# Patient Record
Sex: FEMALE | Race: AMERICAN INDIAN OR ALASKA NATIVE | URBAN - METROPOLITAN AREA
[De-identification: names, ages, dates, MRNs, and addresses within clinical notes are randomized per-mention and may not be internally consistent; named-entity substitution may affect disease eponyms.]

---

## 2017-07-28 ENCOUNTER — INPATIENT (INPATIENT)
Facility: HOSPITAL | Age: 82
LOS: 0 days | Discharge: HOME | End: 2017-07-29
Attending: INTERNAL MEDICINE

## 2017-08-02 DIAGNOSIS — R10.9 UNSPECIFIED ABDOMINAL PAIN: ICD-10-CM

## 2017-08-02 DIAGNOSIS — T83.511A INFECTION AND INFLAMMATORY REACTION DUE TO INDWELLING URETHRAL CATHETER, INITIAL ENCOUNTER: ICD-10-CM

## 2017-08-02 DIAGNOSIS — R53.1 WEAKNESS: ICD-10-CM
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
## 2017-08-02 DIAGNOSIS — R42 DIZZINESS AND GIDDINESS: ICD-10-CM

## 2017-08-02 DIAGNOSIS — I10 ESSENTIAL (PRIMARY) HYPERTENSION: ICD-10-CM

## 2018-05-06 ENCOUNTER — INPATIENT (INPATIENT)
Facility: HOSPITAL | Age: 83
LOS: 4 days | Discharge: HOME | End: 2018-05-11
Attending: INTERNAL MEDICINE | Admitting: INTERNAL MEDICINE

## 2018-05-06 VITALS
HEART RATE: 60 BPM | TEMPERATURE: 96 F | OXYGEN SATURATION: 98 % | DIASTOLIC BLOOD PRESSURE: 92 MMHG | SYSTOLIC BLOOD PRESSURE: 144 MMHG | RESPIRATION RATE: 18 BRPM

## 2018-05-06 LAB
ANION GAP SERPL CALC-SCNC: 20 MMOL/L — HIGH (ref 7–14)
BASOPHILS # BLD AUTO: 0.02 K/UL — SIGNIFICANT CHANGE UP (ref 0–0.2)
BASOPHILS NFR BLD AUTO: 0.3 % — SIGNIFICANT CHANGE UP (ref 0–1)
BUN SERPL-MCNC: 17 MG/DL — SIGNIFICANT CHANGE UP (ref 10–20)
CALCIUM SERPL-MCNC: 8.9 MG/DL — SIGNIFICANT CHANGE UP (ref 8.5–10.1)
CHLORIDE SERPL-SCNC: 101 MMOL/L — SIGNIFICANT CHANGE UP (ref 98–110)
CHOLEST SERPL-MCNC: 158 MG/DL — SIGNIFICANT CHANGE UP (ref 100–200)
CK MB CFR SERPL CALC: 2.6 NG/ML — SIGNIFICANT CHANGE UP (ref 0.6–6.3)
CK SERPL-CCNC: 73 U/L — SIGNIFICANT CHANGE UP (ref 0–225)
CO2 SERPL-SCNC: 19 MMOL/L — SIGNIFICANT CHANGE UP (ref 17–32)
CREAT SERPL-MCNC: 0.9 MG/DL — SIGNIFICANT CHANGE UP (ref 0.7–1.5)
EOSINOPHIL # BLD AUTO: 0.09 K/UL — SIGNIFICANT CHANGE UP (ref 0–0.7)
EOSINOPHIL NFR BLD AUTO: 1.6 % — SIGNIFICANT CHANGE UP (ref 0–8)
ESTIMATED AVERAGE GLUCOSE: 123 MG/DL — HIGH (ref 68–114)
GLUCOSE SERPL-MCNC: 130 MG/DL — HIGH (ref 70–99)
HBA1C BLD-MCNC: 5.9 % — HIGH (ref 4–5.6)
HCT VFR BLD CALC: 41.3 % — SIGNIFICANT CHANGE UP (ref 37–47)
HDLC SERPL-MCNC: 49 MG/DL — SIGNIFICANT CHANGE UP (ref 40–125)
HGB BLD-MCNC: 14.1 G/DL — SIGNIFICANT CHANGE UP (ref 12–16)
IMM GRANULOCYTES NFR BLD AUTO: 0.3 % — SIGNIFICANT CHANGE UP (ref 0.1–0.3)
LIPID PNL WITH DIRECT LDL SERPL: 108 MG/DL — SIGNIFICANT CHANGE UP (ref 4–129)
LYMPHOCYTES # BLD AUTO: 1.59 K/UL — SIGNIFICANT CHANGE UP (ref 1.2–3.4)
LYMPHOCYTES # BLD AUTO: 27.6 % — SIGNIFICANT CHANGE UP (ref 20.5–51.1)
MAGNESIUM SERPL-MCNC: 2.2 MG/DL — SIGNIFICANT CHANGE UP (ref 1.8–2.4)
MCHC RBC-ENTMCNC: 27.5 PG — SIGNIFICANT CHANGE UP (ref 27–31)
MCHC RBC-ENTMCNC: 34.1 G/DL — SIGNIFICANT CHANGE UP (ref 32–37)
MCV RBC AUTO: 80.5 FL — LOW (ref 81–99)
MONOCYTES # BLD AUTO: 0.71 K/UL — HIGH (ref 0.1–0.6)
MONOCYTES NFR BLD AUTO: 12.3 % — HIGH (ref 1.7–9.3)
NEUTROPHILS # BLD AUTO: 3.33 K/UL — SIGNIFICANT CHANGE UP (ref 1.4–6.5)
NEUTROPHILS NFR BLD AUTO: 57.9 % — SIGNIFICANT CHANGE UP (ref 42.2–75.2)
PLATELET # BLD AUTO: 248 K/UL — SIGNIFICANT CHANGE UP (ref 130–400)
POTASSIUM SERPL-MCNC: 4.2 MMOL/L — SIGNIFICANT CHANGE UP (ref 3.5–5)
POTASSIUM SERPL-SCNC: 4.2 MMOL/L — SIGNIFICANT CHANGE UP (ref 3.5–5)
RBC # BLD: 5.13 M/UL — SIGNIFICANT CHANGE UP (ref 4.2–5.4)
RBC # FLD: 13.2 % — SIGNIFICANT CHANGE UP (ref 11.5–14.5)
SODIUM SERPL-SCNC: 140 MMOL/L — SIGNIFICANT CHANGE UP (ref 135–146)
TOTAL CHOLESTEROL/HDL RATIO MEASUREMENT: 3.2 RATIO — LOW (ref 4–5.5)
TRIGL SERPL-MCNC: 84 MG/DL — SIGNIFICANT CHANGE UP (ref 10–149)
TROPONIN T SERPL-MCNC: <0.01 NG/ML — SIGNIFICANT CHANGE UP
TROPONIN T SERPL-MCNC: <0.01 NG/ML — SIGNIFICANT CHANGE UP
WBC # BLD: 5.76 K/UL — SIGNIFICANT CHANGE UP (ref 4.8–10.8)
WBC # FLD AUTO: 5.76 K/UL — SIGNIFICANT CHANGE UP (ref 4.8–10.8)

## 2018-05-06 RX ORDER — SODIUM CHLORIDE 9 MG/ML
1000 INJECTION, SOLUTION INTRAVENOUS ONCE
Qty: 0 | Refills: 0 | Status: COMPLETED | OUTPATIENT
Start: 2018-05-06 | End: 2018-05-06

## 2018-05-06 RX ORDER — LANOLIN ALCOHOL/MO/W.PET/CERES
1 CREAM (GRAM) TOPICAL
Qty: 0 | Refills: 0 | COMMUNITY

## 2018-05-06 RX ORDER — ENOXAPARIN SODIUM 100 MG/ML
60 INJECTION SUBCUTANEOUS
Qty: 0 | Refills: 0 | Status: DISCONTINUED | OUTPATIENT
Start: 2018-05-06 | End: 2018-05-07

## 2018-05-06 RX ORDER — MECLIZINE HCL 12.5 MG
25 TABLET ORAL ONCE
Qty: 0 | Refills: 0 | Status: COMPLETED | OUTPATIENT
Start: 2018-05-06 | End: 2018-05-06

## 2018-05-06 RX ORDER — ASPIRIN/CALCIUM CARB/MAGNESIUM 324 MG
81 TABLET ORAL DAILY
Qty: 0 | Refills: 0 | Status: DISCONTINUED | OUTPATIENT
Start: 2018-05-06 | End: 2018-05-06

## 2018-05-06 RX ORDER — AMLODIPINE BESYLATE 2.5 MG/1
1 TABLET ORAL
Qty: 0 | Refills: 0 | COMMUNITY

## 2018-05-06 RX ORDER — ASPIRIN/CALCIUM CARB/MAGNESIUM 324 MG
325 TABLET ORAL ONCE
Qty: 0 | Refills: 0 | Status: COMPLETED | OUTPATIENT
Start: 2018-05-06 | End: 2018-05-06

## 2018-05-06 RX ORDER — METOCLOPRAMIDE HCL 10 MG
10 TABLET ORAL ONCE
Qty: 0 | Refills: 0 | Status: COMPLETED | OUTPATIENT
Start: 2018-05-06 | End: 2018-05-06

## 2018-05-06 RX ORDER — ATORVASTATIN CALCIUM 80 MG/1
80 TABLET, FILM COATED ORAL AT BEDTIME
Qty: 0 | Refills: 0 | Status: DISCONTINUED | OUTPATIENT
Start: 2018-05-06 | End: 2018-05-06

## 2018-05-06 RX ORDER — VALSARTAN 80 MG/1
160 TABLET ORAL DAILY
Qty: 0 | Refills: 0 | Status: DISCONTINUED | OUTPATIENT
Start: 2018-05-06 | End: 2018-05-10

## 2018-05-06 RX ORDER — ENOXAPARIN SODIUM 100 MG/ML
40 INJECTION SUBCUTANEOUS EVERY 24 HOURS
Qty: 0 | Refills: 0 | Status: DISCONTINUED | OUTPATIENT
Start: 2018-05-06 | End: 2018-05-06

## 2018-05-06 RX ORDER — LANOLIN ALCOHOL/MO/W.PET/CERES
3 CREAM (GRAM) TOPICAL AT BEDTIME
Qty: 0 | Refills: 0 | Status: DISCONTINUED | OUTPATIENT
Start: 2018-05-06 | End: 2018-05-10

## 2018-05-06 RX ADMIN — ENOXAPARIN SODIUM 60 MILLIGRAM(S): 100 INJECTION SUBCUTANEOUS at 21:42

## 2018-05-06 RX ADMIN — Medication 3 MILLIGRAM(S): at 21:42

## 2018-05-06 RX ADMIN — Medication 10 MILLIGRAM(S): at 02:30

## 2018-05-06 RX ADMIN — Medication 20 MILLIGRAM(S): at 13:31

## 2018-05-06 RX ADMIN — Medication 325 MILLIGRAM(S): at 04:36

## 2018-05-06 RX ADMIN — Medication 25 MILLIGRAM(S): at 01:48

## 2018-05-06 RX ADMIN — SODIUM CHLORIDE 2000 MILLILITER(S): 9 INJECTION, SOLUTION INTRAVENOUS at 02:22

## 2018-05-06 RX ADMIN — ENOXAPARIN SODIUM 60 MILLIGRAM(S): 100 INJECTION SUBCUTANEOUS at 13:31

## 2018-05-06 NOTE — H&P ADULT - NSHPSOCIALHISTORY_GEN_ALL_CORE
Patient does not smoke, drink alcohol, or use recreational drugs.  She lives with her grandson and ambulates using a cane.  Denies history of falls.

## 2018-05-06 NOTE — ED PROVIDER NOTE - MEDICAL DECISION MAKING DETAILS
86f w dizziness concerning for vertigo, nonfocal neuro. Labs & CT head reviewed. During eval patient went into new onset a-fib w RVR which rate controlled just prior to receiving diltiazem. Patient admitted to telemetry for cardiac eval, further care, and management.

## 2018-05-06 NOTE — ED ADULT NURSE NOTE - CHPI ED SYMPTOMS NEG
no fever/no chills/no back pain/no vomiting/no nausea/no cough/no diaphoresis/no chest pain/no shortness of breath/no syncope

## 2018-05-06 NOTE — ED PROVIDER NOTE - NS ED ROS FT
Pt denied fvr/chills, HA, visual changes, presyncope, LOC, CP, PEREIRA, PND, SOB, cough, hemoptysis, abd pain, n/v/d, changes in bowel or bladder habits, LE edema, numbness, weakness, changes in gait.  The pt also denied recent travel outside of the country, recent illnesses, sick contacts, recent airplane trips or periods of immobility/bedbound.

## 2018-05-06 NOTE — CONSULT NOTE ADULT - ASSESSMENT
1-Dizziness, true vertigo likely peripheral, follow up with primary team  2-paroxysmal afib with RVR, with significant sinus bradycardia as baseline, hemodynamically stable, no syncope.       Plan:  -tele  -2decho  -Check TSH  -Avoid AV ousmane blockers  -CHADSVASC = 4 will benefit from full anticoagulation for primary stroke prevention, continue lovenox therapeutic for now , will be candidate for NOAC at long term anticogulation  -EP evaluation for possible need of PPM given significant bradycardia at baseline in setting of pafib with RVR.   -Will continue to follow . 1-Dizziness, true vertigo likely peripheral, follow up with primary team  2-paroxysmal afib with RVR, with significant sinus bradycardia as baseline, hemodynamically stable, no syncope with baseline high vagal tone in setting of vertigo and nausea     Plan:  -tele  -2decho  -Check TSH  -Avoid AV ousmane blockers  -CHADSVASC = 4 will benefit from full anticoagulation for primary stroke prevention, continue lovenox therapeutic for now , will be candidate for NOAC at long term anticogulation  -No need for urgent pacing for now.  -Will continue to follow . 1-Dizziness, true vertigo likely peripheral, follow up with primary team  2-paroxysmal afib with RVR, with significant sinus bradycardia as baseline, hemodynamically stable, no syncope with baseline high vagal tone in setting of vertigo and nausea     Plan:  -tele  -2decho  -Check TSH  -Avoid AV ousmane blockers  -CHADSVASC = 4 will benefit from full anticoagulation for primary stroke prevention, continue lovenox therapeutic for now , will be candidate for NOAC at long term anticoagulation with Eliquis 2.5 mg q12  -No need for urgent pacing for now.  -Will continue to follow .

## 2018-05-06 NOTE — ED PROVIDER NOTE - ATTENDING CONTRIBUTION TO CARE
86f w a hx of HTN presents w family reporting room spinning dizzyness. Pt has had similar symptoms regularly for the past 2 years along w intermittent ringing in ears. But today was worse symptoms. Symptoms are constant, moderate, no exacerbating/alleviating.     Review of Systems  Constitutional:  No fever or chills.   Eyes:  Negative.   ENMT:  No nasal congestion, discharge, or throat pain.   Cardiac:  No chest pain, syncope, or edema.  Respiratory:  No dyspnea, wheezing, or cough. No hemoptysis.  GI:  No diarrhea or abdominal pain. No melena or hematochezia. +Dry heaves  :  No dysuria or hematuria.   Musculoskeletal:  No joint swelling, joint pain, or back pain.  Skin:  No skin rash, jaundice, or lesions.  Neuro:  No headache, loss of sensation, or focal weakness.  No change in mental status.     Physical Exam  General: Awake, alert, NAD, elderly/frail, non-toxic appearing, NCAT  Eyes: PERRL, EOMI, no icterus, lids and conjunctivae are normal  ENT: External inspection normal, pink/moist membranes, pharynx normal  CV: S1S2, regular rate, irregular rhythm, no murmur/gallops/rubs, no JVD, 2+ pulses b/l, no edema/cords/homans, warm/well-perfused  Respiratory: Normal respiratory rate/effort, no respiratory distress, normal voice, speaking full sentences, lungs clear to auscultation b/l, no wheezing/rales/rhonchi, no retractions, no stridor  Abdomen: Soft abdomen, no tender/distended/guarding/rebound, no CVA tender  Musculoskeletal: FROM all 4 extremities, N/V intact  Neck: FROM neck, supple, no meningismus, trachea midline, no JVD  Integumentary: Color normal for race, warm and dry, no rash  Neuro: Alert/interactive, CN 2-12 intact, normal motor, normal sensory, normal finger-to-nose    86f w dizziness concerning for vertigo, n/v intact. --CBC, CMP, enzymes, EKG< CT head. --Antiemetics/antivert as needed, observe/re-assess. 86f w a hx of HTN presents w family reporting room spinning dizziness. Pt has had similar symptoms regularly for the past 2 years along w intermittent ringing in ears. But today was worse symptoms. Symptoms are constant, moderate, no exacerbating/alleviating.     Review of Systems  Constitutional:  No fever or chills.   Eyes:  Negative.   ENMT:  No nasal congestion, discharge, or throat pain.   Cardiac:  No chest pain, syncope, or edema.  Respiratory:  No dyspnea, wheezing, or cough. No hemoptysis.  GI:  No diarrhea or abdominal pain. No melena or hematochezia. +Dry heaves  :  No dysuria or hematuria.   Musculoskeletal:  No joint swelling, joint pain, or back pain.  Skin:  No skin rash, jaundice, or lesions.  Neuro:  No headache, loss of sensation, or focal weakness.  No change in mental status.     Physical Exam  General: Awake, alert, NAD, elderly/frail, non-toxic appearing, NCAT  Eyes: PERRL, EOMI, no icterus, lids and conjunctivae are normal  ENT: External inspection normal, pink/moist membranes, pharynx normal  CV: S1S2, regular rate, irregular rhythm, no murmur/gallops/rubs, no JVD, 2+ pulses b/l, no edema/cords/homans, warm/well-perfused  Respiratory: Normal respiratory rate/effort, no respiratory distress, normal voice, speaking full sentences, lungs clear to auscultation b/l, no wheezing/rales/rhonchi, no retractions, no stridor  Abdomen: Soft abdomen, no tender/distended/guarding/rebound, no CVA tender  Musculoskeletal: FROM all 4 extremities, N/V intact  Neck: FROM neck, supple, no meningismus, trachea midline, no JVD  Integumentary: Color normal for race, warm and dry, no rash  Neuro: Alert/interactive, CN 2-12 intact, normal motor, normal sensory, normal finger-to-nose    86f w dizziness concerning for vertigo, n/v intact. --CBC, CMP, enzymes, EKG< CT head. --Antiemetics/antivert as needed, observe/re-assess.

## 2018-05-06 NOTE — CONSULT NOTE ADULT - SUBJECTIVE AND OBJECTIVE BOX
CHIEF COMPLAINT: Dizziness     History taken via [employer] [facility] : [id number]    HISTORY OF PRESENT ILLNESS:     This is a 86 years old female patient with no known cardiac hx, with  PMH of HTN, presented to hospital for persistent severe dizziness, which is described as true vertigo (room spinning sensation accompanied by nausea). she also complains of mild tinnitus. In ED she was found to have afib with RVR, hemodynamically stable that convert spontaneously to sinus bradycardia even before administration of IV cardizem push.   CT head and electrolytes were within normal limits, Timothy negative x 1.    Patient seen at bedside, denies chest pain , shortness of breath or palpitation. Never had syncope in the  past.   Telemetry showed sinus bradycardia with HR ~ 40 mainly , increase with activity to ~ 55.  no evidence of high degree block, longest pause was 3-4s.    Currently patient feels comfortable with no active symptoms.   denies hx of GI bleed    PAST MEDICAL & SURGICAL HISTORY:  Insomnia  HTN (hypertension)  No significant past surgical history    FAMILY HISTORY:  No pertinent family history in first degree relatives    Allergies    No Known Allergies        	  Home Medications:  amLODIPine 5 mg oral tablet: 1 tab(s) orally once a day (at bedtime) (06 May 2018 05:29)  Melatonin 5 mg oral tablet: 1 tab(s) orally once a day (at bedtime) (06 May 2018 05:29)  NexIUM 40 mg oral delayed release capsule: 1 cap(s) orally once a day (06 May 2018 05:29)  PARoxetine 20 mg oral tablet: 1 tab(s) orally once a day (06 May 2018 05:29)  raNITIdine 150 mg oral capsule: 1 cap(s) orally 2 times a day (06 May 2018 05:29)  valsartan 160 mg oral capsule: orally once a day (06 May 2018 05:29)    MEDICATIONS  (STANDING):  enoxaparin Injectable 60 milliGRAM(s) SubCutaneous two times a day  melatonin 3 milliGRAM(s) Oral at bedtime  PARoxetine 20 milliGRAM(s) Oral daily  valsartan 160 milliGRAM(s) Oral daily          PHYSICAL EXAM:  T(C): 36.6 (05-06-18 @ 07:25), Max: 36.6 (05-06-18 @ 07:25)  HR: 50 (05-06-18 @ 14:16) (34 - 60)  BP: 144/57 (05-06-18 @ 14:16) (106/51 - 172/70)  RR: 18 (05-06-18 @ 14:16) (18 - 18)  SpO2: 96% (05-06-18 @ 14:16) (95% - 98%)      General Appearance: Normal	  Cardiovascular: Normal S1 S2, No JVD, No murmurs,  Respiratory: Lungs clear to auscultation	  Psychiatry: A & O x 3,   Gastrointestinal:  Soft, Non-tender  Extremities: no NICA      LABS:	 	                        14.1   5.76  )-----------( 248      ( 06 May 2018 01:22 )             41.3     05-06    140  |  101  |  17  ----------------------------<  130<H>  4.2   |  19  |  0.9    Ca    8.9      06 May 2018 01:22  Mg     2.2     05-06      HgA1c: Hemoglobin A1C, Whole Blood: 5.9 % (05-06 @ 12:49)          CARDIAC MARKERS:  Troponin T, Serum: <0.01 ng/mL (05-06 @ 12:49)  Troponin T, Serum: <0.01 ng/mL (05-06 @ 01:22)            TELEMETRY EVENTS:  sinus bradycardia, longest pause 3-4 s	    ECG:  < from: 12 Lead ECG (05.06.18 @ 07:39) >   Marked sinus bradycardia with sinus arrhythmia    < end of copied text >  	  RADIOLOGY:  < from: CT Head No Cont (05.06.18 @ 01:57) >  Impression:       No evidence of intracranial hemorrhage, territorial infarct, or mass   effect. If symptoms persist, MRI is a moresensitive exam.    < end of copied text >

## 2018-05-06 NOTE — H&P ADULT - NSHPLABSRESULTS_GEN_ALL_CORE
LABS:                         14.1   5.76  )-----------( 248      ( 06 May 2018 01:22 )             41.3     05-06    140  |  101  |  17  ----------------------------<  130<H>  4.2   |  19  |  0.9    Ca    8.9      06 May 2018 01:22    RADIOLOGY, EKG & ADDITIONAL TESTS: Reviewed.    EK. a fib with RVR-134 bpm  2. sinus bradycardia-40 bpm

## 2018-05-06 NOTE — H&P ADULT - ASSESSMENT
86 yof, PMH of HTN, presents with severe dizziness for 2 to 3 years, which has previously been worked up without success.  Found to be in new onset a fib with RVR, which resolved on its own, followed by sinus bradycardia.    1. Dizziness-likely explained by alternating rapid atrial fibrillation and sinus bradycardia.  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  Keep pacing pads on patient.  Hold beta blockers and calcium channel blockers.  Patient has CHADS-Vasc score of 4, secondary to HTN, age, and female gender, with no contraindication to anticoagulation, so will give weight-based lovenox for now.  Check echocardiogram.  Check TSH, magnesium level, secondary sets of Timothy.  Cardiology evaluation. 86 yof, PMH of HTN, presents with severe dizziness for 2 to 3 years, which has previously been worked up without success.  Found to be in new onset a fib with RVR, which resolved on its own, followed by sinus bradycardia.    1. Dizziness-likely explained by alternating rapid atrial fibrillation and sinus bradycardia.  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  Keep pacing pads on patient.  Hold beta blockers and calcium channel blockers.  Patient has CHADS-Vasc score of 4, secondary to HTN, age, and female gender, with no contraindication to anticoagulation, so will give weight-based lovenox for now.  Check echocardiogram.  Check TSH, magnesium level, secondary sets of Timothy.  Cardiology evaluation.  Hold PPI and H2 blocker.  Per grandson, patient was on these medications secondary to nausea during dizziness episodes.  2. HTN-continue valsartan.  Hold amlodipine secondary to bradycardia. 86 yof, PMH of HTN, presents with severe dizziness for 2 to 3 years, which has previously been worked up without success.  Found to be in new onset a fib with RVR, which resolved on its own, followed by sinus bradycardia.    1. Dizziness-likely explained by alternating rapid atrial fibrillation and sinus bradycardia.  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  Keep pacing pads on patient.  Hold beta blockers and calcium channel blockers.  Patient has CHADS-Vasc score of 4, secondary to HTN, age, and female gender, with no contraindication to anticoagulation, so will give weight-based lovenox for now.  Check echocardiogram.  Check TSH, magnesium level, secondary sets of Timothy.  Cardiology evaluation.  Hold PPI and H2 blocker.  Per grandson, patient was on these medications secondary to nausea during dizziness episodes.  2. HTN-continue valsartan.  Hold amlodipine secondary to bradycardia.  3. Insomnia-continue melatonin  4. Heart healthy diet  5. DVT prophylaxis-weight-based lovenox

## 2018-05-06 NOTE — ED PROVIDER NOTE - OBJECTIVE STATEMENT
87 yo F hx of htn p/w dizziness described as roomspinning episode, associated with burping no n/v/d/f/c. denies cp sob. son translating cantonese.

## 2018-05-06 NOTE — H&P ADULT - ATTENDING COMMENTS
87yo F with Past Medical History of HTN admitted to [facility] for dizziness secondary to newly diagnosed atrial fibrillation with alternating RVR and bradycardia.    Dizziness secondary to atrial fibrillation:  R/o tachy-haley syndrome.  The patient appears asymptomatic though her HR has hovered around 40.  A cardiology/EPS evaluation was requested.  The patient may benefit from admission to telemetry vs. upgrade to CCU for close monitoring.  External pacing pads were applied.  Check echocardiogram, TSH, and monitor electrolytes.  Avoid beta blockers or CCBS for now.  Continue therapeutic Lovenox for AC.  If nausea persists, administer Zofran PRN.     HTN: blood pressure stable at present.  CCB on hold.  Hold Diovan unless BP elevated    Insomnia: continue Melatonin    GI/DVT prophylaxis

## 2018-05-06 NOTE — H&P ADULT - HISTORY OF PRESENT ILLNESS
86 yof, PMH of HTN, presents with severe dizziness, which is described as a room spinning sensation accompanied by nausea.  For the past two or three years, she has been having intermittent episodes approximately once monthly and has made various visits to emergency rooms in [place], her primary care doctor, and a cardiology clinic in [place] without a diagnosis.  Her primary care doctor has made numerous medication changes without success.  Patient decided to come to the ED secondary to the increased severity of her dizziness.  CT head and electrolytes were within normal limits.  However, patient went into new onset a fib with RVR (heart rate 134 bpm), which resolved on its own without patient receiving the IV cardizem she was ordered.  Patient was then found to be in sinus bradycardia, with documented heart rate of 30s to 40s.  Other than nausea during episodes of dizziness and chronic insomnia, ROS is negative. 86 yof, PMH of HTN, presents with severe dizziness, which is described as a room spinning sensation accompanied by nausea.  For the past two or three years, she has been having intermittent episodes approximately once monthly and has made various visits to emergency rooms in [place], her primary care doctor, and a cardiology clinic in [place] without a diagnosis.  Her primary care doctor has made numerous medication changes without success.  Patient decided to come to the ED secondary to the increased severity of her dizziness.  CT head and electrolytes were within normal limits, Timothy negative x 1.  However, patient went into new onset a fib with RVR (heart rate 134 bpm), which resolved on its own without patient receiving the IV cardizem she was ordered.  Patient was then found to be in sinus bradycardia, with documented heart rate of 30s to 40s.  Other than nausea during episodes of dizziness and chronic insomnia, ROS is negative.

## 2018-05-06 NOTE — ED PROVIDER NOTE - CARE PLAN
Principal Discharge DX:	Atrial fibrillation Principal Discharge DX:	Atrial fibrillation  Secondary Diagnosis:	Dizziness

## 2018-05-06 NOTE — H&P ADULT - NSHPPHYSICALEXAM_GEN_ALL_CORE
Vital signs: 96.1 144/92 60 18 98% RA    General: NAD, lying comfortably in bed  HEENT: NC/AT  Heart: +S1, S2, bradycardic to 30s-40s on bedside cardiac monitor monitor  Lungs: CTA B/L  Abdomen: soft, NT, ND, +BS  Extremities: no edema  Neuro: AAO, answers questions appropriately per grandson, +5/5 strength in all 4 extremities, intact sensation, no focal deficits

## 2018-05-07 LAB
ALBUMIN SERPL ELPH-MCNC: 3.6 G/DL — SIGNIFICANT CHANGE UP (ref 3.5–5.2)
ALP SERPL-CCNC: 37 U/L — SIGNIFICANT CHANGE UP (ref 30–115)
ALT FLD-CCNC: 8 U/L — SIGNIFICANT CHANGE UP (ref 0–41)
ANION GAP SERPL CALC-SCNC: 12 MMOL/L — SIGNIFICANT CHANGE UP (ref 7–14)
AST SERPL-CCNC: 15 U/L — SIGNIFICANT CHANGE UP (ref 0–41)
BILIRUB SERPL-MCNC: 0.5 MG/DL — SIGNIFICANT CHANGE UP (ref 0.2–1.2)
BUN SERPL-MCNC: 16 MG/DL — SIGNIFICANT CHANGE UP (ref 10–20)
CALCIUM SERPL-MCNC: 8 MG/DL — LOW (ref 8.5–10.1)
CHLORIDE SERPL-SCNC: 106 MMOL/L — SIGNIFICANT CHANGE UP (ref 98–110)
CHOLEST SERPL-MCNC: 165 MG/DL — SIGNIFICANT CHANGE UP (ref 100–200)
CO2 SERPL-SCNC: 23 MMOL/L — SIGNIFICANT CHANGE UP (ref 17–32)
CREAT SERPL-MCNC: 0.9 MG/DL — SIGNIFICANT CHANGE UP (ref 0.7–1.5)
GLUCOSE SERPL-MCNC: 94 MG/DL — SIGNIFICANT CHANGE UP (ref 70–99)
HAV IGM SER-ACNC: SIGNIFICANT CHANGE UP
HBV CORE IGM SER-ACNC: SIGNIFICANT CHANGE UP
HBV SURFACE AG SER-ACNC: SIGNIFICANT CHANGE UP
HCT VFR BLD CALC: 36 % — LOW (ref 37–47)
HCV AB S/CO SERPL IA: 0.16 S/CO — SIGNIFICANT CHANGE UP
HCV AB SERPL-IMP: SIGNIFICANT CHANGE UP
HDLC SERPL-MCNC: 46 MG/DL — SIGNIFICANT CHANGE UP (ref 40–125)
HGB BLD-MCNC: 12 G/DL — SIGNIFICANT CHANGE UP (ref 12–16)
HIV 1+2 AB+HIV1 P24 AG SERPL QL IA: SIGNIFICANT CHANGE UP
LIPID PNL WITH DIRECT LDL SERPL: 113 MG/DL — SIGNIFICANT CHANGE UP (ref 4–129)
MAGNESIUM SERPL-MCNC: 2.1 MG/DL — SIGNIFICANT CHANGE UP (ref 1.8–2.4)
MCHC RBC-ENTMCNC: 27.4 PG — SIGNIFICANT CHANGE UP (ref 27–31)
MCHC RBC-ENTMCNC: 33.3 G/DL — SIGNIFICANT CHANGE UP (ref 32–37)
MCV RBC AUTO: 82.2 FL — SIGNIFICANT CHANGE UP (ref 81–99)
NRBC # BLD: 0 /100 WBCS — SIGNIFICANT CHANGE UP (ref 0–0)
PLATELET # BLD AUTO: 215 K/UL — SIGNIFICANT CHANGE UP (ref 130–400)
POTASSIUM SERPL-MCNC: 4.3 MMOL/L — SIGNIFICANT CHANGE UP (ref 3.5–5)
POTASSIUM SERPL-SCNC: 4.3 MMOL/L — SIGNIFICANT CHANGE UP (ref 3.5–5)
PROT SERPL-MCNC: 6 G/DL — SIGNIFICANT CHANGE UP (ref 6–8)
RBC # BLD: 4.38 M/UL — SIGNIFICANT CHANGE UP (ref 4.2–5.4)
RBC # FLD: 13.5 % — SIGNIFICANT CHANGE UP (ref 11.5–14.5)
SODIUM SERPL-SCNC: 141 MMOL/L — SIGNIFICANT CHANGE UP (ref 135–146)
TOTAL CHOLESTEROL/HDL RATIO MEASUREMENT: 3.6 RATIO — LOW (ref 4–5.5)
TRIGL SERPL-MCNC: 75 MG/DL — SIGNIFICANT CHANGE UP (ref 10–149)
TSH SERPL-MCNC: 7.67 UIU/ML — HIGH (ref 0.27–4.2)
WBC # BLD: 4.26 K/UL — LOW (ref 4.8–10.8)
WBC # FLD AUTO: 4.26 K/UL — LOW (ref 4.8–10.8)

## 2018-05-07 RX ORDER — APIXABAN 2.5 MG/1
2.5 TABLET, FILM COATED ORAL EVERY 12 HOURS
Qty: 0 | Refills: 0 | Status: DISCONTINUED | OUTPATIENT
Start: 2018-05-07 | End: 2018-05-08

## 2018-05-07 RX ORDER — MECLIZINE HCL 12.5 MG
25 TABLET ORAL
Qty: 0 | Refills: 0 | Status: DISCONTINUED | OUTPATIENT
Start: 2018-05-07 | End: 2018-05-10

## 2018-05-07 RX ADMIN — APIXABAN 2.5 MILLIGRAM(S): 2.5 TABLET, FILM COATED ORAL at 18:10

## 2018-05-07 RX ADMIN — Medication 20 MILLIGRAM(S): at 11:57

## 2018-05-07 RX ADMIN — Medication 3 MILLIGRAM(S): at 23:17

## 2018-05-07 NOTE — ED ADULT NURSE REASSESSMENT NOTE - NS ED NURSE REASSESS COMMENT FT1
Inpatient telemetry team notified that patient HR is extremely tachy/ irregular. Pt tachy into the 150s with several second pauses noted. HR as low 60 noted. MD notifed at beside to assess. all alarms on. Pt asymptomatic. will continue to closely monitor.

## 2018-05-07 NOTE — OCCUPATIONAL THERAPY INITIAL EVALUATION ADULT - STANDING BALANCE: STATIC
fair balance
I have personally performed a face to face diagnostic evaluation on this patient. I have reviewed the ACP note and agree with the history, exam and plan of care, except as noted.

## 2018-05-07 NOTE — PROGRESS NOTE ADULT - SUBJECTIVE AND OBJECTIVE BOX
HOSPITALIST ATTENDING NOTE    DIANDRA GALLOWAY  86y Female  [id number]    INTERVAL HPI/OVERNIGHT EVENTS: son in law translating catonese at bedside    T(C): 36.1 (05-07-18 @ 08:26), Max: 37.4 (05-06-18 @ 16:04)  HR: 48 (05-07-18 @ 08:26) (48 - 55)  BP: 164/68 (05-07-18 @ 08:26) (127/59 - 166/77)  RR: 18 (05-07-18 @ 08:26) (18 - 18)  SpO2: 95% (05-07-18 @ 08:26) (95% - 98%)  Wt(kg): --      PHYSICAL EXAM:  GENERAL: NAD  HEAD:  Atraumatic, Normocephalic  EYES: EOMI, PERRLA, conjunctiva and sclera clear, no nystagmus  ENMT: No tonsillar erythema, exudates, or enlargement  NECK: Supple, No JVD, Normal thyroid  NERVOUS SYSTEM:  Alert & Oriented X3, Good concentration; Non-focal- Motor Strength 5/5 B/L upper and lower extremities;  CHEST/LUNG: Clear to ascultation bilaterally; No rales, rhonchi, wheezing,   HEART: Regular rate and rhythm; EDUARD murmurs, rubs, or gallops  ABDOMEN: Soft, Nontender, Nondistended; Bowel sounds present  EXTREMITIES: No clubbing, cyanosis, or edema  LYMPH: No lymphadenopathy noted  SKIN: No rashes or lesions  PSYCH: No suicidal/homicial ideation/hallucinations    Consultant(s) Notes Reviewed:  [x ] YES  [ ] NO  Care Discussed with Consultants/Other Providers/ Housestaff [ x] YES  [ ] NO    LABS:                        12.0   4.26  )-----------( 215      ( 07 May 2018 06:42 )             36.0     05-07    141  |  106  |  16  ----------------------------<  94  4.3   |  23  |  0.9    Ca    8.0<L>      07 May 2018 06:42  Mg     2.1     05-07    TPro  6.0  /  Alb  3.6  /  TBili  0.5  /  DBili  x   /  AST  15  /  ALT  8   /  AlkPhos  37  05-07          RADIOLOGY & ADDITIONAL TESTS:    Imaging or report Personally Reviewed:  [ ] YES  [ ] NO    Case discussed with resident    Care discussed with pt/family      HEALTH ISSUES - PROBLEM Dx:

## 2018-05-07 NOTE — PROGRESS NOTE ADULT - SUBJECTIVE AND OBJECTIVE BOX
Patient is a 86y old  Female who presents with a chief complaint of Dizziness for 2-3 years, worse on day of presentation (06 May 2018 08:32)    HPI:  86 yof, PMH of HTN, presents with severe dizziness, which is described as a room spinning sensation accompanied by nausea.  For the past two or three years, she has been having intermittent episodes approximately once monthly and has made various visits to emergency rooms in [place], her primary care doctor, and a cardiology clinic in [place] without a diagnosis.  Her primary care doctor has made numerous medication changes without success.  Patient decided to come to the ED secondary to the increased severity of her dizziness.  CT head and electrolytes were within normal limits, Timothy negative x 1.  However, patient went into new onset a fib with RVR (heart rate 134 bpm), which resolved on its own without patient receiving the IV cardizem she was ordered.  Patient was then found to be in sinus bradycardia, with documented heart rate of 30s to 40s.  Other than nausea during episodes of dizziness and chronic insomnia, ROS is negative. (06 May 2018 08:32)      SUBJ:  Patient seen and examined. Remains in SB in 50-60 range. No complaints this AM      MEDICATIONS  (STANDING):  enoxaparin Injectable 60 milliGRAM(s) SubCutaneous two times a day  melatonin 3 milliGRAM(s) Oral at bedtime  PARoxetine 20 milliGRAM(s) Oral daily  valsartan 160 milliGRAM(s) Oral daily    MEDICATIONS  (PRN):            Vital Signs Last 24 Hrs  T(C): 36.1 (07 May 2018 08:26), Max: 37.4 (06 May 2018 16:04)  T(F): 97 (07 May 2018 08:26), Max: 99.4 (06 May 2018 16:04)  HR: 48 (07 May 2018 08:26) (39 - 55)  BP: 164/68 (07 May 2018 08:26) (113/56 - 166/77)  BP(mean): --  RR: 18 (07 May 2018 08:26) (18 - 18)  SpO2: 95% (07 May 2018 08:26) (95% - 98%)      PHYSICAL EXAM:    GEN: AAO, NAD  HEENT: NC/AT, PERRL  Neck: No JVD, no bruits  CV: Reg, S1-S2, 2/6 murmur  Lungs: CTAB  Abd: Soft, non-tender  Ext: No edema      I&O's Summary  	    TELEMETRY: SB        TTE: pending      LABS:                        12.0   4.26  )-----------( 215      ( 07 May 2018 06:42 )             36.0     05-07    141  |  106  |  16  ----------------------------<  94  4.3   |  23  |  0.9    Ca    8.0<L>      07 May 2018 06:42  Mg     2.1     05-07    TPro  6.0  /  Alb  3.6  /  TBili  0.5  /  DBili  x   /  AST  15  /  ALT  8   /  AlkPhos  37  05-07    CARDIAC MARKERS ( 06 May 2018 12:49 )  x     / <0.01 ng/mL / 73 U/L / x     / 2.6 ng/mL  CARDIAC MARKERS ( 06 May 2018 01:22 )  x     / <0.01 ng/mL / x     / x     / x                BNP  RADIOLOGY & ADDITIONAL STUDIES:      IMPRESSION AND PLAN:

## 2018-05-07 NOTE — PROGRESS NOTE ADULT - ASSESSMENT
Remains in NSR  2D echo pending  Avoid AV ousmane blockers  BP control  Ambulate the patient to assess the chronotropic response  If HR remains significantly repressed, obtain EP eval.  F/u TSH

## 2018-05-07 NOTE — PROGRESS NOTE ADULT - ASSESSMENT
86 yof, PMH of HTN, presents with severe dizziness for 2 to 3 years, which has previously been worked up without success.  Found to be in new onset a fib with RVR, which resolved on its own, followed by sinus bradycardia.    # Dizziness-likely explained by alternating rapid atrial fibrillation and sinus bradycardia.  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   - CE*2 neg, electrolytes normal  - Keep pacing pads on patient.    - Avoid AV ousmane blocking agent  - since 12:50 pm pt has been in A Fib w/ RVR upto 140-150--> spoke w/ Cardiology fellow(Dr Warner)--> continue holding AV ousmane blockers as pt has risk of going to bradycardia--> EP eval for PPM  - lovenox d/kehinde and switched to eliquis 2.5 q12h for anticoagulation for A Fib  - Check echocardiogram    - TSH--> 7.67---> check t3, t4 for further evaluation     # Vertigo likely peripheral resolved   - meclizine prn    # HTN-continue valsartan    # Insomnia-continue melatonin  # Heart healthy diet  # DVT prophylaxis-weight-based lovenox

## 2018-05-07 NOTE — PROGRESS NOTE ADULT - ASSESSMENT
86 yof, PMH of HTN, presents with severe dizziness, which is described as a room spinning sensation accompanied by nausea.  For the past two or three years, she has been having intermittent episodes approximately once monthly and has made various visits to emergency rooms in [place], her primary care doctor, and a cardiology clinic in [place] without a diagnosis.  Her primary care doctor has made numerous medication changes without success.  Patient decided to come to the ED secondary to the increased severity of her dizziness.  CT head and electrolytes were within normal limits, Timothy negative x 1.  However, patient went into new onset a fib with RVR (heart rate 134 bpm), which resolved on its own without patient receiving the IV cardizem she was ordered.  Patient was then found to be in sinus bradycardia, with documented heart rate of 30s to 40s.  Other than nausea during episodes of dizziness and chronic insomnia, ROS is negative. (06 May 2018 08:32)      # Vertigo - likely peripheral - improved - meclizine prn  #P.Afib - change to eliquis, awaiting echo  -pt spontaneously converted from rapid afib to sinus with braycardia  #sinus bradycardia - ambulate pt to check chronotropic response - avoid AVNblockers  -check TSH  ambulate pt

## 2018-05-07 NOTE — PROGRESS NOTE ADULT - SUBJECTIVE AND OBJECTIVE BOX
SUBJECTIVE:    Patient is a 86y old Female who presents with a chief complaint of Dizziness for 2-3 years, worse on day of presentation (06 May 2018 08:32)    Currently admitted to medicine with the primary diagnosis of Atrial fibrillation     Today is hospital day 1d. This morning she is resting comfortably in bed and reports no new issues or overnight events.     PAST MEDICAL & SURGICAL HISTORY  Insomnia  HTN (hypertension)  No significant past surgical history    SOCIAL HISTORY:  Negative for smoking/alcohol/drug use.     ALLERGIES:  No Known Allergies    MEDICATIONS:  STANDING MEDICATIONS  apixaban 2.5 milliGRAM(s) Oral every 12 hours  melatonin 3 milliGRAM(s) Oral at bedtime  PARoxetine 20 milliGRAM(s) Oral daily  valsartan 160 milliGRAM(s) Oral daily    PRN MEDICATIONS  meclizine 25 milliGRAM(s) Oral four times a day PRN    VITALS:   T(F): 96.4  HR: 133  BP: 108/76  RR: 18  SpO2: 95%    LABS:                        12.0   4.26  )-----------( 215      ( 07 May 2018 06:42 )             36.0     05-07    141  |  106  |  16  ----------------------------<  94  4.3   |  23  |  0.9    Ca    8.0<L>      07 May 2018 06:42  Mg     2.1     05-07    TPro  6.0  /  Alb  3.6  /  TBili  0.5  /  DBili  x   /  AST  15  /  ALT  8   /  AlkPhos  37  05-07      CARDIAC MARKERS ( 06 May 2018 12:49 )  x     / <0.01 ng/mL / 73 U/L / x     / 2.6 ng/mL  CARDIAC MARKERS ( 06 May 2018 01:22 )  x     / <0.01 ng/mL / x     / x     / x          RADIOLOGY:    PHYSICAL EXAM:  GEN: No acute distress  LUNGS: Clear to auscultation bilaterally   HEART: S1/S2 present. RRR.   ABD: Soft, non-tender, non-distended. Bowel sounds present  EXT: NC/NC/NE/2+PP/KING  NEURO: AAOX3

## 2018-05-08 DIAGNOSIS — I10 ESSENTIAL (PRIMARY) HYPERTENSION: ICD-10-CM

## 2018-05-08 DIAGNOSIS — I49.5 SICK SINUS SYNDROME: ICD-10-CM

## 2018-05-08 LAB
ANION GAP SERPL CALC-SCNC: 15 MMOL/L — HIGH (ref 7–14)
BUN SERPL-MCNC: 14 MG/DL — SIGNIFICANT CHANGE UP (ref 10–20)
CALCIUM SERPL-MCNC: 8.6 MG/DL — SIGNIFICANT CHANGE UP (ref 8.5–10.1)
CHLORIDE SERPL-SCNC: 106 MMOL/L — SIGNIFICANT CHANGE UP (ref 98–110)
CO2 SERPL-SCNC: 23 MMOL/L — SIGNIFICANT CHANGE UP (ref 17–32)
CREAT SERPL-MCNC: 0.9 MG/DL — SIGNIFICANT CHANGE UP (ref 0.7–1.5)
GLUCOSE SERPL-MCNC: 96 MG/DL — SIGNIFICANT CHANGE UP (ref 70–99)
HCT VFR BLD CALC: 38.7 % — SIGNIFICANT CHANGE UP (ref 37–47)
HGB BLD-MCNC: 12.8 G/DL — SIGNIFICANT CHANGE UP (ref 12–16)
MAGNESIUM SERPL-MCNC: 2.3 MG/DL — SIGNIFICANT CHANGE UP (ref 1.8–2.4)
MCHC RBC-ENTMCNC: 27.3 PG — SIGNIFICANT CHANGE UP (ref 27–31)
MCHC RBC-ENTMCNC: 33.1 G/DL — SIGNIFICANT CHANGE UP (ref 32–37)
MCV RBC AUTO: 82.5 FL — SIGNIFICANT CHANGE UP (ref 81–99)
NRBC # BLD: 0 /100 WBCS — SIGNIFICANT CHANGE UP (ref 0–0)
PLATELET # BLD AUTO: 225 K/UL — SIGNIFICANT CHANGE UP (ref 130–400)
POTASSIUM SERPL-MCNC: 4.5 MMOL/L — SIGNIFICANT CHANGE UP (ref 3.5–5)
POTASSIUM SERPL-SCNC: 4.5 MMOL/L — SIGNIFICANT CHANGE UP (ref 3.5–5)
RBC # BLD: 4.69 M/UL — SIGNIFICANT CHANGE UP (ref 4.2–5.4)
RBC # FLD: 13.5 % — SIGNIFICANT CHANGE UP (ref 11.5–14.5)
SODIUM SERPL-SCNC: 144 MMOL/L — SIGNIFICANT CHANGE UP (ref 135–146)
WBC # BLD: 4.06 K/UL — LOW (ref 4.8–10.8)
WBC # FLD AUTO: 4.06 K/UL — LOW (ref 4.8–10.8)

## 2018-05-08 RX ORDER — ENOXAPARIN SODIUM 100 MG/ML
60 INJECTION SUBCUTANEOUS
Qty: 0 | Refills: 0 | Status: DISCONTINUED | OUTPATIENT
Start: 2018-05-08 | End: 2018-05-09

## 2018-05-08 RX ADMIN — ENOXAPARIN SODIUM 60 MILLIGRAM(S): 100 INJECTION SUBCUTANEOUS at 17:37

## 2018-05-08 RX ADMIN — Medication 3 MILLIGRAM(S): at 21:49

## 2018-05-08 RX ADMIN — VALSARTAN 160 MILLIGRAM(S): 80 TABLET ORAL at 06:31

## 2018-05-08 RX ADMIN — Medication 20 MILLIGRAM(S): at 11:51

## 2018-05-08 RX ADMIN — APIXABAN 2.5 MILLIGRAM(S): 2.5 TABLET, FILM COATED ORAL at 06:31

## 2018-05-08 NOTE — PROGRESS NOTE ADULT - ASSESSMENT
tacy-haley syndrome.    hypothyroidism - needs correction    HTN    PAF    Recommend: Obtain 2 D echo today.  EP consult today to evaluate for DDD pacemaker.  Once has PPM back-up, will be able to use BB or Cardizem.  C/w Diovan. C/w anticoagulation.

## 2018-05-08 NOTE — PROGRESS NOTE ADULT - ASSESSMENT
86 yof, PMH of HTN, presents with severe dizziness for 2 to 3 years, which has previously been worked up without success.  Found to be in new onset a fib with RVR, which resolved on its own, followed by sinus bradycardia.    # Dizziness-likely explained by alternating rapid atrial fibrillation and sinus bradycardia.  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   - CE*2 neg, electrolytes normal  - Keep pacing pads on patient.    - Avoid AV ousmane blocking agent  - since 12:50 pm pt has been in A Fib w/ RVR upto 140-150--> spoke w/ Cardiology fellow(Dr Warner)--> continue holding AV ousmane blockers as pt has risk of going to bradycardia--> EP eval for PPM  - lovenox d/kehinde and switched to eliquis 2.5 q12h for anticoagulation for A Fib  - Check echocardiogram    - TSH--> 7.67---> check t3, t4 for further evaluation     # Vertigo likely peripheral resolved   - meclizine prn    # HTN-continue valsartan    # Insomnia-continue melatonin  # Heart healthy diet  # DVT prophylaxis-weight-based lovenox 86 yof, PMH of HTN, presents with severe dizziness for 2 to 3 years, which has previously been worked up without success.  Found to be in new onset a fib with RVR, which resolved on its own, followed by sinus bradycardia.    # Dizziness-likely explained by alternating rapid atrial fibrillation and sinus bradycardia.  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   - CE*2 neg, electrolytes normal  - f/u echo result  - Keep pacing pads on patient.    - Avoid AV ousmane blocking agent  - eliquis changed to lovenox as pt might be going for PPM  - Dr Villegas is going to see the pt for PPM    # Subclinical hypothyroidism  - TSH--> 7.67---> t3, t4-->P  # Vertigo likely peripheral resolved   - meclizine prn  # HTN-continue valsartan  # Insomnia-continue melatonin  # Heart healthy diet  # DVT prophylaxis-weight-based lovenox

## 2018-05-08 NOTE — PROGRESS NOTE ADULT - SUBJECTIVE AND OBJECTIVE BOX
SUBJ:  Patient seen and examined. Events noted. Paroxysmal AF, tachy-haley syndrome.      MEDICATIONS  (STANDING):  apixaban 2.5 milliGRAM(s) Oral every 12 hours  melatonin 3 milliGRAM(s) Oral at bedtime  PARoxetine 20 milliGRAM(s) Oral daily  valsartan 160 milliGRAM(s) Oral daily    MEDICATIONS  (PRN):  meclizine 25 milliGRAM(s) Oral four times a day PRN Dizziness            Vital Signs Last 24 Hrs  T(C): 36.6 (08 May 2018 07:33), Max: 36.6 (08 May 2018 07:33)  T(F): 97.8 (08 May 2018 07:33), Max: 97.8 (08 May 2018 07:33)  HR: 52 (08 May 2018 07:33) (48 - 133)  BP: 148/67 (08 May 2018 07:33) (108/76 - 164/68)  BP(mean): --  RR: 18 (08 May 2018 07:33) (18 - 19)  SpO2: 96% (08 May 2018 07:33) (95% - 96%)      PHYSICAL EXAM:    GEN: AAO, NAD  HEENT: NC/AT, PERRL  Neck: No JVD, no bruits  CV: Reg, S1-S2, no murmur  Lungs: CTAB  Abd: Soft, non-tender  Ext: No edema      I&O's Summary  	        TTE: pending      LABS:                        12.0   4.26  )-----------( 215      ( 07 May 2018 06:42 )             36.0     05-07    141  |  106  |  16  ----------------------------<  94  4.3   |  23  |  0.9    Ca    8.0<L>      07 May 2018 06:42  Mg     2.1     05-07    TPro  6.0  /  Alb  3.6  /  TBili  0.5  /  DBili  x   /  AST  15  /  ALT  8   /  AlkPhos  37  05-07    CARDIAC MARKERS ( 06 May 2018 12:49 )  x     / <0.01 ng/mL / 73 U/L / x     / 2.6 ng/mL            BNP  RADIOLOGY & ADDITIONAL STUDIES:      IMPRESSION AND PLAN:

## 2018-05-08 NOTE — PROGRESS NOTE ADULT - SUBJECTIVE AND OBJECTIVE BOX
SUBJECTIVE:    Patient is a 86y old Female who presents with a chief complaint of Dizziness for 2-3 years, worse on day of presentation (06 May 2018 08:32)    Currently admitted to medicine with the primary diagnosis of Atrial fibrillation     Today is hospital day 2d. This morning she is resting comfortably in bed and reports no new issues or overnight events.     PAST MEDICAL & SURGICAL HISTORY  Insomnia  HTN (hypertension)  No significant past surgical history    SOCIAL HISTORY:  Negative for smoking/alcohol/drug use.     ALLERGIES:  No Known Allergies    MEDICATIONS:  STANDING MEDICATIONS  apixaban 2.5 milliGRAM(s) Oral every 12 hours  melatonin 3 milliGRAM(s) Oral at bedtime  PARoxetine 20 milliGRAM(s) Oral daily  valsartan 160 milliGRAM(s) Oral daily    PRN MEDICATIONS  meclizine 25 milliGRAM(s) Oral four times a day PRN    VITALS:   T(F): 97.8  HR: 52  BP: 148/67  RR: 18  SpO2: 96%    LABS:                        12.0   4.26  )-----------( 215      ( 07 May 2018 06:42 )             36.0     05-07    141  |  106  |  16  ----------------------------<  94  4.3   |  23  |  0.9    Ca    8.0<L>      07 May 2018 06:42  Mg     2.1     05-07    TPro  6.0  /  Alb  3.6  /  TBili  0.5  /  DBili  x   /  AST  15  /  ALT  8   /  AlkPhos  37  05-07              CARDIAC MARKERS ( 06 May 2018 12:49 )  x     / <0.01 ng/mL / 73 U/L / x     / 2.6 ng/mL      RADIOLOGY:    PHYSICAL EXAM:  GEN: No acute distress  LUNGS: Clear to auscultation bilaterally   HEART: S1/S2 present. RRR.   ABD: Soft, non-tender, non-distended. Bowel sounds present  EXT: NC/NC/NE/2+PP/KING  NEURO: AAOX3 SUBJECTIVE:    Patient is a 86y old Female who presents with a chief complaint of Dizziness for 2-3 years, worse on day of presentation (06 May 2018 08:32)    Currently admitted to medicine with the primary diagnosis of Atrial fibrillation     Today is hospital day 2d. This morning she is resting comfortably in bed and reports no new issues or overnight events.     PAST MEDICAL & SURGICAL HISTORY  Insomnia  HTN (hypertension)  No significant past surgical history    SOCIAL HISTORY:  Negative for smoking/alcohol/drug use.     ALLERGIES:  No Known Allergies    MEDICATIONS:  STANDING MEDICATIONS  apixaban 2.5 milliGRAM(s) Oral every 12 hours  melatonin 3 milliGRAM(s) Oral at bedtime  PARoxetine 20 milliGRAM(s) Oral daily  valsartan 160 milliGRAM(s) Oral daily    PRN MEDICATIONS  meclizine 25 milliGRAM(s) Oral four times a day PRN    VITALS:   T(F): 97.8  HR: 52  BP: 148/67  RR: 18  SpO2: 96%    LABS:                                     12.8   4.06  )-----------( 225      ( 08 May 2018 06:38 )             38.7       05-08    144  |  106  |  14  ----------------------------<  96  4.5   |  23  |  0.9    Ca    8.6      08 May 2018 06:38  Mg     2.3     05-08    TPro  6.0  /  Alb  3.6  /  TBili  0.5  /  DBili  x   /  AST  15  /  ALT  8   /  AlkPhos  37  05-07                Lactate Trend            CAPILLARY BLOOD GLUCOSE                           PHYSICAL EXAM:  GEN: No acute distress  LUNGS: Clear to auscultation bilaterally   HEART: S1/S2 present. RRR.   ABD: Soft, non-tender, non-distended. Bowel sounds present  EXT: NC/NC/NE/2+PP/KING  NEURO: AAOX3

## 2018-05-08 NOTE — CONSULT NOTE ADULT - PROBLEM SELECTOR RECOMMENDATION 9
paroxysmal atrial fibrillation   4 second conversion pauses   symptomatic dizziness x 2 years   recommend DCP  start on amiodarone after pacemaker  continue anticoagulation

## 2018-05-08 NOTE — PROGRESS NOTE ADULT - ATTENDING COMMENTS
Patient seen and examined independently. I agree with the resident's note, physical exam, and plan except as below.  events noted -went into rapid afib last night    #tachy-haley syndrome Afib - keep on lovenox q12 -(hold eliquis until after procedure)  rate control - eps consult for PPM  -check echo  -Tsh mildly elevated - check TFTs    #HTN - stable    #Vertigo - meclizine prn Patient seen and examined independently. I agree with the resident's note, physical exam, and plan except as below.  events noted -went into rapid afib last night - tele reviewed - currently 57bpm sinus haley    #tachy-haley syndrome Afib - keep on lovenox q12 -(hold eliquis until after procedure)  rate control - eps consult for PPM  -check echo  -Tsh mildly elevated - check TFTs    #HTN - stable    #Vertigo - meclizine prn\    discussed with HS and son in law at bedside

## 2018-05-08 NOTE — CONSULT NOTE ADULT - SUBJECTIVE AND OBJECTIVE BOX
Patient is a 86y old  Female who presents with a chief complaint of Dizziness for 2-3 years, worse on day of presentation (06 May 2018 08:32)    HPI:  85 y/o female PMH as below.  Telemonitored shows Paroxysmal AF, tachy-haley syndrome.    PAST MEDICAL & SURGICAL HISTORY:  Insomnia  HTN (hypertension)    No significant past surgical history      PREVIOUS DIAGNOSTIC TESTING:      no ischemic workup          MEDICATIONS  (STANDING):  apixaban 2.5 milliGRAM(s) Oral every 12 hours  melatonin 3 milliGRAM(s) Oral at bedtime  PARoxetine 20 milliGRAM(s) Oral daily  valsartan 160 milliGRAM(s) Oral daily    MEDICATIONS  (PRN):  meclizine 25 milliGRAM(s) Oral four times a day PRN Dizziness      FAMILY HISTORY:  No pertinent family history in first degree relatives      SOCIAL HISTORY:    CIGARETTES: denies    ALCOHOL: denies       Vital Signs Last 24 Hrs  T(C): 36.6 (08 May 2018 07:33), Max: 36.6 (08 May 2018 07:33)  T(F): 97.8 (08 May 2018 07:33), Max: 97.8 (08 May 2018 07:33)  HR: 52 (08 May 2018 07:33) (52 - 133)  BP: 148/67 (08 May 2018 07:33) (108/76 - 148/67)  BP(mean): --  RR: 18 (08 May 2018 07:33) (18 - 19)  SpO2: 96% (08 May 2018 07:33) (95% - 96%)          INTERPRETATION OF TELEMETRY:    Multiple pauses, tachy arrhythmia , and Bradycardia     ECG:    < from: 12 Lead ECG (05.06.18 @ 07:39) >  Ventricular Rate 40 BPM    Atrial Rate 40 BPM    P-R Interval 180 ms    QRS Duration 86 ms    Q-T Interval 526 ms    QTC Calculation(Bezet) 428 ms    P Axis 63 degrees    R Axis 31 degrees    T Axis -32 degrees    Diagnosis Line Marked sinus bradycardia with sinus arrhythmia  T wave abnormality, consider anterior ischemia  Abnormal ECG      < end of copied text >          LABS:                        12.8   4.06  )-----------( 225      ( 08 May 2018 06:38 )             38.7     05-07    141  |  106  |  16  ----------------------------<  94  4.3   |  23  |  0.9    Ca    8.0<L>      07 May 2018 06:42  Mg     2.1     05-07    TPro  6.0  /  Alb  3.6  /  TBili  0.5  /  DBili  x   /  AST  15  /  ALT  8   /  AlkPhos  37  05-07    CARDIAC MARKERS ( 06 May 2018 12:49 )  x     / <0.01 ng/mL / 73 U/L / x     / 2.6 ng/mL          RADIOLOGY & ADDITIONAL STUDIES:    CXR:  pending     Echo:  < from: Transthoracic Echocardiogram (05.08.18 @ 08:19) >  Date of Exam:        5/8/2018 8:19:37 AM  Referring Physician: [id number] RICHARD LOWERY  Sonographer:         Kerwin Villalobos  Fellow:              Sandeep Villatoro  Reading Physician:   Hardeep Agosto M.D.    Procedure:     2D Echo/Doppler/Color Doppler Complete.  Indications:   R94.31 - Abnormal Electrocardiogram [ECG] [EKG]  Study Details: Technically good study.         Summary:   1. Normal global left ventricular systolic function.   2. Mild mitral annular calcification.   3. PSAP at least 38.   4. Mild aortic regurgitation.    PHYSICIAN INTERPRETATION:  Left Ventricle: The left ventricular internal cavity size is normal. Left   ventricular wall thickness is normal. Global LV systolic function was   normal. Normal segmental left ventricular systolic function.  Right Ventricle: Normal right ventricular size and function.  Left Atrium: Mildly enlarged left atrium.  Right Atrium: Normal right atrial size.  Pericardium: There is no evidence of pericardial effusion.  Mitral Valve: The mitral valve is normal in structure. There is mild   mitral annular calcification. Trace mitral valve regurgitation is seen.  Tricuspid Valve: The tricuspid valve is normal in structure. Trivial   tricuspid regurgitation is visualized. PSAP at least 38.  Aortic Valve: The aortic valve is trileaflet. No evidence of aortic   stenosis. Mild aortic valve regurgitation is seen.  Pulmonic Valve: Mild pulmonic valve regurgitation.  Aorta: The aortic root is normal in size and structure.    < end of copied text >

## 2018-05-09 LAB
ANION GAP SERPL CALC-SCNC: 15 MMOL/L — HIGH (ref 7–14)
APTT BLD: 29.4 SEC — SIGNIFICANT CHANGE UP (ref 27–39.2)
BUN SERPL-MCNC: 13 MG/DL — SIGNIFICANT CHANGE UP (ref 10–20)
CALCIUM SERPL-MCNC: 8.5 MG/DL — SIGNIFICANT CHANGE UP (ref 8.5–10.1)
CHLORIDE SERPL-SCNC: 103 MMOL/L — SIGNIFICANT CHANGE UP (ref 98–110)
CO2 SERPL-SCNC: 22 MMOL/L — SIGNIFICANT CHANGE UP (ref 17–32)
CREAT SERPL-MCNC: 0.8 MG/DL — SIGNIFICANT CHANGE UP (ref 0.7–1.5)
GLUCOSE SERPL-MCNC: 160 MG/DL — HIGH (ref 70–99)
HCT VFR BLD CALC: 40.2 % — SIGNIFICANT CHANGE UP (ref 37–47)
HGB BLD-MCNC: 13.3 G/DL — SIGNIFICANT CHANGE UP (ref 12–16)
INR BLD: 1.02 RATIO — SIGNIFICANT CHANGE UP (ref 0.65–1.3)
MAGNESIUM SERPL-MCNC: 2.1 MG/DL — SIGNIFICANT CHANGE UP (ref 1.8–2.4)
MCHC RBC-ENTMCNC: 27 PG — SIGNIFICANT CHANGE UP (ref 27–31)
MCHC RBC-ENTMCNC: 33.1 G/DL — SIGNIFICANT CHANGE UP (ref 32–37)
MCV RBC AUTO: 81.7 FL — SIGNIFICANT CHANGE UP (ref 81–99)
NRBC # BLD: 0 /100 WBCS — SIGNIFICANT CHANGE UP (ref 0–0)
PLATELET # BLD AUTO: 245 K/UL — SIGNIFICANT CHANGE UP (ref 130–400)
POTASSIUM SERPL-MCNC: 4.1 MMOL/L — SIGNIFICANT CHANGE UP (ref 3.5–5)
POTASSIUM SERPL-SCNC: 4.1 MMOL/L — SIGNIFICANT CHANGE UP (ref 3.5–5)
PROTHROM AB SERPL-ACNC: 11 SEC — SIGNIFICANT CHANGE UP (ref 9.95–12.87)
RBC # BLD: 4.92 M/UL — SIGNIFICANT CHANGE UP (ref 4.2–5.4)
RBC # FLD: 13.4 % — SIGNIFICANT CHANGE UP (ref 11.5–14.5)
SODIUM SERPL-SCNC: 140 MMOL/L — SIGNIFICANT CHANGE UP (ref 135–146)
T3 SERPL-MCNC: 82 NG/DL — SIGNIFICANT CHANGE UP (ref 80–200)
T4 AB SER-ACNC: 7.1 UG/DL — SIGNIFICANT CHANGE UP (ref 4.6–12)
WBC # BLD: 4.08 K/UL — LOW (ref 4.8–10.8)
WBC # FLD AUTO: 4.08 K/UL — LOW (ref 4.8–10.8)

## 2018-05-09 RX ORDER — ENOXAPARIN SODIUM 100 MG/ML
60 INJECTION SUBCUTANEOUS
Qty: 0 | Refills: 0 | Status: DISCONTINUED | OUTPATIENT
Start: 2018-05-09 | End: 2018-05-10

## 2018-05-09 RX ADMIN — Medication 3 MILLIGRAM(S): at 21:43

## 2018-05-09 RX ADMIN — ENOXAPARIN SODIUM 60 MILLIGRAM(S): 100 INJECTION SUBCUTANEOUS at 11:04

## 2018-05-09 RX ADMIN — Medication 20 MILLIGRAM(S): at 12:19

## 2018-05-09 RX ADMIN — VALSARTAN 160 MILLIGRAM(S): 80 TABLET ORAL at 06:07

## 2018-05-09 NOTE — PROGRESS NOTE ADULT - ASSESSMENT
tacy-haley syndrome.    hypothyroidism - needs correction    HTN    PAF    EP consult appreciated, plan for DDD pacemaker.  Once has PPM back-up, will be able to use BB or Cardizem.  C/w Diovan. C/w anticoagulation.

## 2018-05-09 NOTE — PROGRESS NOTE ADULT - SUBJECTIVE AND OBJECTIVE BOX
HOSPITALIST ATTENDING NOTE    DIANDRA GALLOWAY  86y Female  [id number]    INTERVAL HPI/OVERNIGHT EVENTS:    T(C): 37 (05-09-18 @ 13:01), Max: 37 (05-09-18 @ 13:01)  HR: 56 (05-09-18 @ 13:01) (52 - 61)  BP: 138/65 (05-09-18 @ 13:01) (130/78 - 157/70)  RR: 18 (05-09-18 @ 13:01) (18 - 18)  SpO2: 94% (05-09-18 @ 07:27) (94% - 97%)  Wt(kg): --    05-09-18 @ 07:01  -  05-09-18 @ 15:14  --------------------------------------------------------  IN: 1010 mL / OUT: 0 mL / NET: 1010 mL        PHYSICAL EXAM:  GENERAL: NAD  HEAD:  Atraumatic, Normocephalic  EYES: EOMI, PERRLA, conjunctiva and sclera clear  NECK: Supple, No JVD, Normal thyroid  NERVOUS SYSTEM:  Alert & Oriented X3, Good concentration; Non-focal- Motor Strength 5/5 B/L upper and lower extremities;  CHEST/LUNG: Clear to ascultation bilaterally; No rales, rhonchi, wheezing,   HEART: Regular rate and rhythm; No murmurs, rubs, or gallops  ABDOMEN: Soft, Nontender, Nondistended; Bowel sounds present  EXTREMITIES: No clubbing, cyanosis, or edema  LYMPH: No lymphadenopathy noted  SKIN: No rashes or lesions  PSYCH: No suicidal/homicial ideation/hallucinations    Consultant(s) Notes Reviewed:  [x ] YES  [ ] NO  Care Discussed with Consultants/Other Providers/ Housestaff [ x] YES  [ ] NO    LABS:                        13.3   4.08  )-----------( 245      ( 09 May 2018 09:49 )             40.2     05-09    140  |  103  |  13  ----------------------------<  160<H>  4.1   |  22  |  0.8    Ca    8.5      09 May 2018 09:49  Mg     2.1     05-09            RADIOLOGY & ADDITIONAL TESTS:    Imaging or report Personally Reviewed:  [ ] YES  [ ] NO    Case discussed with resident    Care discussed with pt/family      HEALTH ISSUES - PROBLEM Dx:  Hypertension, unspecified type: Hypertension, unspecified type  Tachycardia-bradycardia: Tachycardia-bradycardia

## 2018-05-09 NOTE — PROGRESS NOTE ADULT - SUBJECTIVE AND OBJECTIVE BOX
Patient is a 86y old  Female who presents with a chief complaint of Dizziness for 2-3 years, worse on day of presentation (06 May 2018 08:32)    HPI:  86 yof, PMH of HTN, presents with severe dizziness, which is described as a room spinning sensation accompanied by nausea.  For the past two or three years, she has been having intermittent episodes approximately once monthly and has made various visits to emergency rooms in [place], her primary care doctor, and a cardiology clinic in [place] without a diagnosis.  Her primary care doctor has made numerous medication changes without success.  Patient decided to come to the ED secondary to the increased severity of her dizziness.  CT head and electrolytes were within normal limits, Timothy negative x 1.  However, patient went into new onset a fib with RVR (heart rate 134 bpm), which resolved on its own without patient receiving the IV cardizem she was ordered.  Patient was then found to be in sinus bradycardia, with documented heart rate of 30s to 40s.  Other than nausea during episodes of dizziness and chronic insomnia, ROS is negative. (06 May 2018 08:32)      SUBJ:  Patient seen and examined.      MEDICATIONS  (STANDING):  melatonin 3 milliGRAM(s) Oral at bedtime  PARoxetine 20 milliGRAM(s) Oral daily  valsartan 160 milliGRAM(s) Oral daily    MEDICATIONS  (PRN):  meclizine 25 milliGRAM(s) Oral four times a day PRN Dizziness            Vital Signs Last 24 Hrs  T(C): 35.6 (09 May 2018 05:35), Max: 36.7 (08 May 2018 21:01)  T(F): 96 (09 May 2018 05:35), Max: 98.1 (08 May 2018 21:01)  HR: 52 (09 May 2018 05:35) (52 - 61)  BP: 157/70 (09 May 2018 05:35) (130/78 - 157/70)  BP(mean): --  RR: 18 (09 May 2018 05:35) (18 - 18)  SpO2: 94% (09 May 2018 07:27) (94% - 97%)      PHYSICAL EXAM:    GEN: AAO x 3, NAD  HEENT: NC/AT, PERRL  Neck: No JVD, no bruits  CV: Reg, S1-S2, no murmur  Lungs: CTAB  Abd: Soft, non-tender  Ext: No edema      I&O's Summary  	    TELEMETRY:    ECG:    TTE:      LABS:                        12.8   4.06  )-----------( 225      ( 08 May 2018 06:38 )             38.7     05-08    144  |  106  |  14  ----------------------------<  96  4.5   |  23  |  0.9    Ca    8.6      08 May 2018 06:38  Mg     2.3     05-08                BNP  RADIOLOGY & ADDITIONAL STUDIES:      IMPRESSION AND PLAN:

## 2018-05-09 NOTE — PROGRESS NOTE ADULT - SUBJECTIVE AND OBJECTIVE BOX
Patient is a 86y old  Female who presents with a chief complaint of Dizziness for 2-3 years, worse on day of presentation (06 May 2018 08:32)      Overnight events:   No acute event overnight.   Patient is doing well. Reports that her dizziness is better this morning.        PAST MEDICAL & SURGICAL HISTORY:  Insomnia  HTN (hypertension)  No significant past surgical history      Allergies  No Known Allergies        MEDICATIONS  (STANDING):  enoxaparin Injectable 60 milliGRAM(s) SubCutaneous two times a day  melatonin 3 milliGRAM(s) Oral at bedtime  PARoxetine 20 milliGRAM(s) Oral daily  valsartan 160 milliGRAM(s) Oral daily    MEDICATIONS  (PRN):  meclizine 25 milliGRAM(s) Oral four times a day PRN Dizziness        Vital Signs Last 24 Hrs  T(C): 37 (09 May 2018 13:01), Max: 37 (09 May 2018 13:01)  T(F): 98.6 (09 May 2018 13:01), Max: 98.6 (09 May 2018 13:01)  HR: 56 (09 May 2018 13:01) (52 - 61)  BP: 138/65 (09 May 2018 13:01) (130/78 - 157/70)  BP(mean): --  RR: 18 (09 May 2018 13:01) (18 - 18)  SpO2: 94% (09 May 2018 07:27) (94% - 97%)  CAPILLARY BLOOD GLUCOSE      I&O's Summary    09 May 2018 07:01  -  09 May 2018 15:41  --------------------------------------------------------  IN: 1010 mL / OUT: 0 mL / NET: 1010 mL          Physical Exam:    -     General : Not in acute distress.    -      HEENT: PEERLA    -      Cardiac: S1 & S2. No murmur/gallop/rubs.     -      Pulm: clear to auscultate b/l lung field.     -      GI: positive BS. No tenderness/rigidity/rebound.     -      Musculoskeletal: no pitting edema.     -      Neuro: A & O3.         Labs:                        13.3   4.08  )-----------( 245      ( 09 May 2018 09:49 )             40.2                           05-09    140  |  103  |  13  ----------------------------<  160<H>  4.1   |  22  |  0.8    Ca    8.5      09 May 2018 09:49  Mg     2.1     05-09    PT/INR - ( 09 May 2018 09:49 )   PT: 11.00 sec;   INR: 1.02 ratio       PTT - ( 09 May 2018 09:49 )  PTT:29.4 sec                                 Imaging:  Echo 5/7:  Normal global left ventricular systolic function. Mild mitral annular calcification.PSAP at least 38. Mild aortic regurgitation.      ECG:

## 2018-05-09 NOTE — CONSULT NOTE ADULT - SUBJECTIVE AND OBJECTIVE BOX
Surgeon: /Janice/ Rodriguez    Consult requesting by: Dr. Villegas    HPI:  86 yof, PMH of HTN, presents with severe dizziness, which is described as a room spinning sensation accompanied by nausea.  For the past two or three years, she has been having intermittent episodes approximately once monthly and has made various visits to emergency rooms in [place], her primary care doctor, and a cardiology clinic in [place] without a diagnosis.  Her primary care doctor has made numerous medication changes without success.  Patient decided to come to the ED secondary to the increased severity of her dizziness.  CT head and electrolytes were within normal limits, Timothy negative x 1.  However, patient went into new onset a fib with RVR (heart rate 134 bpm), which resolved on its own without patient receiving the IV cardizem she was ordered.  Patient was then found to be in sinus bradycardia, with documented heart rate of 30s to 40s.  Other than nausea during episodes of dizziness and chronic insomnia, ROS is negative. (06 May 2018 08:32) CT surgery consulted for PPm Placement        PAST MEDICAL & SURGICAL HISTORY:  Insomnia  HTN (hypertension)  No significant past surgical history      MEDICATIONS  (STANDING):  enoxaparin Injectable 60 milliGRAM(s) SubCutaneous two times a day  melatonin 3 milliGRAM(s) Oral at bedtime  PARoxetine 20 milliGRAM(s) Oral daily  valsartan 160 milliGRAM(s) Oral daily    MEDICATIONS  (PRN):  meclizine 25 milliGRAM(s) Oral four times a day PRN Dizziness    Antiplatelet therapy:                           Last dose/amt:    Allergies    No Known Allergies    Intolerances          Review of Systems  CONSTITUTIONAL:  Fevers[ ] chills[ ] sweats[ ] fatigue[ ] weight loss[ ] weight gain [ ]                                     NEGATIVE [X ]   NEURO:  parathesias[ ] seizures [ ]  syncope [ ]  confusion [ ]                                                                                NEGATIVE[ X]   EYES: glasses[ ]  blurry vision[ ]  discharge[ ] pain[ ] glaucoma [ ]                                                                          NEGATIVE[X ]   ENMT:  difficulty hearing [ ]  vertigo[ ]  dysphagia[ ] epistaxis[ ] recent dental work [ ]                                    NEGATIVE[ X]   CV:  chest pain[ ] palpitations[ ] PEREIRA [ ] diaphoresis [ ]                                                                                           NEGATIVE[ X]   RESPIRATORY:  wheezing[ ] SOB[ ] cough [ ] sputum[ ] hemoptysis[ ]                                                                  NEGATIVE[ x]   GI:  nausea[ ]  vommiting [ ]  diarrhea[ ] constipation [ ] melena [ ]                                                                      NEGATIVE[ X]   : hematuria[ ]  dysuria[ ] urgency[ ] incontinence[ ]                                                                                            NEGATIVE[ X]   MUSKULOSKELETAL:  arthritis[ ]  joint swelling [ ] muscle weakness [ ] Hx vein stripping [ ]                             NEGATIVE[X ]   SKIN/BREAST:  rash[ ] itching [ ]  hair loss[ ] masses[ ]                                                                                              NEGATIVE[ X]   PSYCH:  dementia [ ] depresion [ ] anxiety[ ]                                                                                                               NEGATIVE[X ]   HEME/LYMPH:  bruises easily[ ] enlarged lymph nodes[ ] tender lymph nodes[ ]                                               NEGATIVE[ X]   ENDOCRINE:  cold intolerance[ ] heat intolerance[ ] polydipsia[ ]                                                                          NEGATIVE[ X]     PHYSICAL EXAM  Vital Signs Last 24 Hrs  T(C): 37 (09 May 2018 13:01), Max: 37 (09 May 2018 13:01)  T(F): 98.6 (09 May 2018 13:01), Max: 98.6 (09 May 2018 13:01)  HR: 56 (09 May 2018 13:01) (52 - 61)  BP: 138/65 (09 May 2018 13:01) (130/78 - 157/70)  BP(mean): --  RR: 18 (09 May 2018 13:01) (18 - 18)  SpO2: 94% (09 May 2018 07:27) (94% - 97%)  Right arm bp: Left arm bp;    CONSTITUTIONAL:                                                                          WNL[x ]   Neuro: WNL[ x] Normal exam oriented to person/place & time with no focal motor or sensory  deficits. Other                     Eyes: WNL[x ]   Normal exam of conjunctiva & lids, pupils equally reactive. Other     ENT: WNL[ x]    Normal exam of nasal/oral mucosa with absence of cyanosis. Other  Neck: WNL[x ]  Normal exam of jugular veins, trachea & thyroid. Other  Chest: WNL[x ] Normal lung exam with good air movement absence of wheezes, rales, or rhonchi: Other                                                                                CV:  Auscultation: normal [x ] S3[ ] S4[ ] Irregular [ ] Rub[ ] Clicks[ ]    Murmurs none:[x ]systolic [ ]  diastolic [ ] holosystolic [ ]  Carotids: No Bruits[ x] Other                 Abdominal Aorta: normal [ ] nonpalpable[ ]Other                                                                                      GI:           WNL[ x] Normal exam of abdomen, liver & spleen with no noted masses or tenderness. Other                                                                                                        Extremities: WNL[ x] Normal no evidence of cyanosis or deformity Edema: none[ ]trace[ ]1+[ ]2+[ ]3+[ ]4+[ ]  Lower Extremity Pulses: Right[x ] Left[x ]Varicosities[ ]  SKIN :WNL[ x] Normal exam to inspection & palation. Other:                                                          LABS:                        13.3   4.08  )-----------( 245      ( 09 May 2018 09:49 )             40.2     05-09    140  |  103  |  13  ----------------------------<  160<H>  4.1   |  22  |  0.8    Ca    8.5      09 May 2018 09:49  Mg     2.1     05-09      PT/INR - ( 09 May 2018 09:49 )   PT: 11.00 sec;   INR: 1.02 ratio         PTT - ( 09 May 2018 09:49 )  PTT:29.4 sec            Assessment/ Plan:  PPM Placement by Dr. Frias tomorrow

## 2018-05-09 NOTE — PROGRESS NOTE ADULT - ASSESSMENT
85 yo F PMH of HTN, presents with severe dizziness for 2 to 3 years, which has previously been worked up without success.  Found to be in new onset a fib with RVR, which resolved on its own, followed by sinus bradycardia.      Dizziness-likely explained by alternating rapid atrial fibrillation and sinus bradycardia.  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   - CE x 2 neg, electrolytes normal  - Echo: LVEF wnl. Mild aortic regurgitation.  - c/w pacing pads on patient.    - Avoid AV ousamne blocking agent for now.   - will hold lovenox for tonight and tomorrow AM  - keep NPO after midnight  - Patient will be going for PPM placement tomorrow or Friday.  - f/u EP: PPM placement tomorrow or Friday  - f/u Cardio     Subclinical hypothyroidism  - TSH 7.67 but T3 and T4 wnl.  - will repeat TSH again.     Vertigo likely peripheral (resolved)  - CTH: No evidence of intracranial hemorrhage, territorial infarct, or mass effect. If symptoms persist, MRI is a more sensitive exam.  - c/w meclizine prn    HTN  - c/w valsartan    Insomnia  -c/w melatonin      # Heart healthy diet  # DVT prophylaxis (lovenox holding)

## 2018-05-09 NOTE — PROGRESS NOTE ADULT - ASSESSMENT
86 yof, PMH of HTN, presents with severe dizziness for 2 to 3 years, which has previously been worked up without success.  Found to be in new onset a fib with RVR, which resolved on its own, followed by sinus bradycardia.    # Dizziness-likely explained by alternating rapid atrial fibrillation and sinus bradycardia.  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   - CE*2 neg, electrolytes normal  - f/u echo result  - Keep pacing pads on patient.    - Avoid AV ousmane blocking agent  - eliquis changed to lovenox as pt might be going for PPM  - Dr Villegas rec dual chamber pacing and amio after    # Subclinical hypothyroidism  - TSH--> 7.67---> t3, t4-->P  # Vertigo likely peripheral resolved   - meclizine prn  # HTN-continue valsartan  # Insomnia-continue melatonin  # Heart healthy diet  # DVT prophylaxis-weight-based lovenox

## 2018-05-10 LAB
ALBUMIN SERPL ELPH-MCNC: 3.8 G/DL — SIGNIFICANT CHANGE UP (ref 3.5–5.2)
ALP SERPL-CCNC: 42 U/L — SIGNIFICANT CHANGE UP (ref 30–115)
ALT FLD-CCNC: 17 U/L — SIGNIFICANT CHANGE UP (ref 0–41)
ANION GAP SERPL CALC-SCNC: 15 MMOL/L — HIGH (ref 7–14)
AST SERPL-CCNC: 20 U/L — SIGNIFICANT CHANGE UP (ref 0–41)
BASOPHILS # BLD AUTO: 0.03 K/UL — SIGNIFICANT CHANGE UP (ref 0–0.2)
BASOPHILS NFR BLD AUTO: 0.7 % — SIGNIFICANT CHANGE UP (ref 0–1)
BILIRUB SERPL-MCNC: 0.4 MG/DL — SIGNIFICANT CHANGE UP (ref 0.2–1.2)
BUN SERPL-MCNC: 14 MG/DL — SIGNIFICANT CHANGE UP (ref 10–20)
CALCIUM SERPL-MCNC: 8.6 MG/DL — SIGNIFICANT CHANGE UP (ref 8.5–10.1)
CHLORIDE SERPL-SCNC: 103 MMOL/L — SIGNIFICANT CHANGE UP (ref 98–110)
CO2 SERPL-SCNC: 22 MMOL/L — SIGNIFICANT CHANGE UP (ref 17–32)
CREAT SERPL-MCNC: 0.9 MG/DL — SIGNIFICANT CHANGE UP (ref 0.7–1.5)
EOSINOPHIL # BLD AUTO: 0.18 K/UL — SIGNIFICANT CHANGE UP (ref 0–0.7)
EOSINOPHIL NFR BLD AUTO: 3.9 % — SIGNIFICANT CHANGE UP (ref 0–8)
GLUCOSE SERPL-MCNC: 94 MG/DL — SIGNIFICANT CHANGE UP (ref 70–99)
HCT VFR BLD CALC: 39.1 % — SIGNIFICANT CHANGE UP (ref 37–47)
HGB BLD-MCNC: 12.8 G/DL — SIGNIFICANT CHANGE UP (ref 12–16)
IMM GRANULOCYTES NFR BLD AUTO: 0.2 % — SIGNIFICANT CHANGE UP (ref 0.1–0.3)
LYMPHOCYTES # BLD AUTO: 1.42 K/UL — SIGNIFICANT CHANGE UP (ref 1.2–3.4)
LYMPHOCYTES # BLD AUTO: 30.9 % — SIGNIFICANT CHANGE UP (ref 20.5–51.1)
MCHC RBC-ENTMCNC: 26.9 PG — LOW (ref 27–31)
MCHC RBC-ENTMCNC: 32.7 G/DL — SIGNIFICANT CHANGE UP (ref 32–37)
MCV RBC AUTO: 82.3 FL — SIGNIFICANT CHANGE UP (ref 81–99)
MONOCYTES # BLD AUTO: 0.54 K/UL — SIGNIFICANT CHANGE UP (ref 0.1–0.6)
MONOCYTES NFR BLD AUTO: 11.7 % — HIGH (ref 1.7–9.3)
NEUTROPHILS # BLD AUTO: 2.42 K/UL — SIGNIFICANT CHANGE UP (ref 1.4–6.5)
NEUTROPHILS NFR BLD AUTO: 52.6 % — SIGNIFICANT CHANGE UP (ref 42.2–75.2)
NRBC # BLD: 0 /100 WBCS — SIGNIFICANT CHANGE UP (ref 0–0)
PLATELET # BLD AUTO: 220 K/UL — SIGNIFICANT CHANGE UP (ref 130–400)
POTASSIUM SERPL-MCNC: 4.5 MMOL/L — SIGNIFICANT CHANGE UP (ref 3.5–5)
POTASSIUM SERPL-SCNC: 4.5 MMOL/L — SIGNIFICANT CHANGE UP (ref 3.5–5)
PROT SERPL-MCNC: 6.5 G/DL — SIGNIFICANT CHANGE UP (ref 6–8)
RBC # BLD: 4.75 M/UL — SIGNIFICANT CHANGE UP (ref 4.2–5.4)
RBC # FLD: 13.3 % — SIGNIFICANT CHANGE UP (ref 11.5–14.5)
SODIUM SERPL-SCNC: 140 MMOL/L — SIGNIFICANT CHANGE UP (ref 135–146)
WBC # BLD: 4.6 K/UL — LOW (ref 4.8–10.8)
WBC # FLD AUTO: 4.6 K/UL — LOW (ref 4.8–10.8)

## 2018-05-10 RX ORDER — SODIUM CHLORIDE 9 MG/ML
1000 INJECTION INTRAMUSCULAR; INTRAVENOUS; SUBCUTANEOUS
Qty: 0 | Refills: 0 | Status: DISCONTINUED | OUTPATIENT
Start: 2018-05-10 | End: 2018-05-11

## 2018-05-10 RX ORDER — VALSARTAN 80 MG/1
160 TABLET ORAL DAILY
Qty: 0 | Refills: 0 | Status: DISCONTINUED | OUTPATIENT
Start: 2018-05-10 | End: 2018-05-11

## 2018-05-10 RX ORDER — MECLIZINE HCL 12.5 MG
25 TABLET ORAL
Qty: 0 | Refills: 0 | Status: DISCONTINUED | OUTPATIENT
Start: 2018-05-10 | End: 2018-05-11

## 2018-05-10 RX ORDER — ACETAMINOPHEN 500 MG
650 TABLET ORAL EVERY 4 HOURS
Qty: 0 | Refills: 0 | Status: DISCONTINUED | OUTPATIENT
Start: 2018-05-10 | End: 2018-05-11

## 2018-05-10 RX ORDER — LANOLIN ALCOHOL/MO/W.PET/CERES
3 CREAM (GRAM) TOPICAL AT BEDTIME
Qty: 0 | Refills: 0 | Status: DISCONTINUED | OUTPATIENT
Start: 2018-05-10 | End: 2018-05-11

## 2018-05-10 RX ORDER — APIXABAN 2.5 MG/1
2.5 TABLET, FILM COATED ORAL EVERY 12 HOURS
Qty: 0 | Refills: 0 | Status: DISCONTINUED | OUTPATIENT
Start: 2018-05-11 | End: 2018-05-11

## 2018-05-10 RX ORDER — ONDANSETRON 8 MG/1
4 TABLET, FILM COATED ORAL ONCE
Qty: 0 | Refills: 0 | Status: DISCONTINUED | OUTPATIENT
Start: 2018-05-10 | End: 2018-05-11

## 2018-05-10 RX ORDER — MORPHINE SULFATE 50 MG/1
2 CAPSULE, EXTENDED RELEASE ORAL
Qty: 0 | Refills: 0 | Status: DISCONTINUED | OUTPATIENT
Start: 2018-05-10 | End: 2018-05-11

## 2018-05-10 RX ORDER — CEFAZOLIN SODIUM 1 G
1000 VIAL (EA) INJECTION EVERY 8 HOURS
Qty: 0 | Refills: 0 | Status: COMPLETED | OUTPATIENT
Start: 2018-05-10 | End: 2018-05-11

## 2018-05-10 RX ADMIN — VALSARTAN 160 MILLIGRAM(S): 80 TABLET ORAL at 16:28

## 2018-05-10 RX ADMIN — Medication 3 MILLIGRAM(S): at 21:34

## 2018-05-10 RX ADMIN — VALSARTAN 160 MILLIGRAM(S): 80 TABLET ORAL at 06:27

## 2018-05-10 RX ADMIN — Medication 100 MILLIGRAM(S): at 21:34

## 2018-05-10 RX ADMIN — Medication 650 MILLIGRAM(S): at 21:10

## 2018-05-10 RX ADMIN — Medication 20 MILLIGRAM(S): at 16:58

## 2018-05-10 RX ADMIN — SODIUM CHLORIDE 50 MILLILITER(S): 9 INJECTION INTRAMUSCULAR; INTRAVENOUS; SUBCUTANEOUS at 16:25

## 2018-05-10 RX ADMIN — Medication 650 MILLIGRAM(S): at 20:33

## 2018-05-10 NOTE — PROGRESS NOTE ADULT - ASSESSMENT
BP control.  PPM today by CT surgery  Re-start apixiban post-op.  Will add BB or Amio at the EP discretion post PPM.

## 2018-05-10 NOTE — PROGRESS NOTE ADULT - SUBJECTIVE AND OBJECTIVE BOX
DIANDRA GALLOWAY  86y Female    INTERVAL HPI/OVERNIGHT EVENTS:  Patient seen and examined.    ROS: All other systems are negative.    Vital Signs:    T(F): 97.5 (05-10-18 @ 05:54), Max: 98.6 (05-09-18 @ 13:01)  HR: 50 (05-10-18 @ 09:03) (50 - 56)  BP: 133/64 (05-10-18 @ 08:30) (133/64 - 153/58)  RR: 18 (05-10-18 @ 08:30) (16 - 18)  SpO2: 94% (05-10-18 @ 09:03) (94% - 96%)  I&O's Summary    09 May 2018 07:01  -  10 May 2018 07:00  --------------------------------------------------------  IN: 1010 mL / OUT: 150 mL / NET: 860 mL      Daily Height in cm: 154.94 (10 May 2018 09:03)    Daily   CAPILLARY BLOOD GLUCOSE          PHYSICAL EXAM:    GENERAL:  NAD  SKIN: No rashes or lesions  HENT: Atrumatic. Normocephalic. PERRL. Moist membranes.  NECK: Supple, No JVD. No lymphadenopathy.  PULMONARY: CTA B/L. No wheezing. No rales  CVS: Normal S1, S2. Rate and Rythm are regular. No murmurs.  ABDOMEN/GI: Soft, Nontender, Nondistended; BS present  EXTREMITIES: Peripheral pulses intact. No edema B/L LE.  NEUROLOGIC:  No motor or sensory deficit.  PSYCH: Alert & oriented x 3    Consultant(s) Notes Reviewed:  [x ] YES  [ ] NO  Care Discussed with Consultants/Other Providers [ x] YES  [ ] NO    EKG reviewed  Telemetry reviewed    LABS:                        12.8   4.60  )-----------( 220      ( 10 May 2018 06:43 )             39.1     05-10    140  |  103  |  14  ----------------------------<  94  4.5   |  22  |  0.9    Ca    8.6      10 May 2018 06:43  Mg     2.1     05-09    TPro  6.5  /  Alb  3.8  /  TBili  0.4  /  DBili  x   /  AST  20  /  ALT  17  /  AlkPhos  42  05-10    PT/INR - ( 09 May 2018 09:49 )   PT: 11.00 sec;   INR: 1.02 ratio         PTT - ( 09 May 2018 09:49 )  PTT:29.4 sec          RADIOLOGY & ADDITIONAL TESTS:      Imaging or report Personally Reviewed:  [ ] YES  [ ] NO    Medications:  Standing  enoxaparin Injectable 60 milliGRAM(s) SubCutaneous two times a day  melatonin 3 milliGRAM(s) Oral at bedtime  PARoxetine 20 milliGRAM(s) Oral daily  valsartan 160 milliGRAM(s) Oral daily    PRN Meds  meclizine 25 milliGRAM(s) Oral four times a day PRN      Case discussed with resident    Care discussed with pt/family

## 2018-05-10 NOTE — PROGRESS NOTE ADULT - SUBJECTIVE AND OBJECTIVE BOX
SUBJ:  Patient seen and examined. For PPM today.      MEDICATIONS  (STANDING):  enoxaparin Injectable 60 milliGRAM(s) SubCutaneous two times a day  melatonin 3 milliGRAM(s) Oral at bedtime  PARoxetine 20 milliGRAM(s) Oral daily  valsartan 160 milliGRAM(s) Oral daily    MEDICATIONS  (PRN):  meclizine 25 milliGRAM(s) Oral four times a day PRN Dizziness            Vital Signs Last 24 Hrs  T(C): 36.4 (10 May 2018 08:30), Max: 37 (09 May 2018 13:01)  T(F): 97.5 (10 May 2018 05:54), Max: 98.6 (09 May 2018 13:01)  HR: 55 (10 May 2018 08:30) (55 - 56)  BP: 133/64 (10 May 2018 08:30) (133/64 - 153/58)  BP(mean): --  RR: 18 (10 May 2018 08:30) (16 - 18)  SpO2: 96% (10 May 2018 00:33) (96% - 96%)      PHYSICAL EXAM:    GEN: AAO x 3, NAD  HEENT: NC/AT, PERRL  Neck: No JVD, no bruits  CV: Reg, S1-S2, no murmur  Lungs: CTAB  Abd: Soft, non-tender  Ext: No edema      I&O's Summary    09 May 2018 07:01  -  10 May 2018 07:00  --------------------------------------------------------  IN: 1010 mL / OUT: 150 mL / NET: 860 mL    	      LABS:                        12.8   4.60  )-----------( 220      ( 10 May 2018 06:43 )             39.1     05-09    140  |  103  |  13  ----------------------------<  160<H>  4.1   |  22  |  0.8    Ca    8.5      09 May 2018 09:49  Mg     2.1     05-09          PT/INR - ( 09 May 2018 09:49 )   PT: 11.00 sec;   INR: 1.02 ratio         PTT - ( 09 May 2018 09:49 )  PTT:29.4 sec      BNP  RADIOLOGY & ADDITIONAL STUDIES:      IMPRESSION AND PLAN:

## 2018-05-10 NOTE — BRIEF OPERATIVE NOTE - PROCEDURE
<<-----Click on this checkbox to enter Procedure Insertion of DDD cardiac pacemaker  05/10/2018    Active  FROSELL

## 2018-05-10 NOTE — PRE-ANESTHESIA EVALUATION ADULT - NSANTHOSAYNRD_GEN_A_CORE
No. KUSHAL screening performed.  STOP BANG Legend: 0-2 = LOW Risk; 3-4 = INTERMEDIATE Risk; 5-8 = HIGH Risk
No. KUSHAL screening performed.  STOP BANG Legend: 0-2 = LOW Risk; 3-4 = INTERMEDIATE Risk; 5-8 = HIGH Risk

## 2018-05-10 NOTE — PROGRESS NOTE ADULT - SUBJECTIVE AND OBJECTIVE BOX
Patient is a 86y old  Female who presents with a chief complaint of Dizziness for 2-3 years, worse on day of presentation (06 May 2018 08:32)      Overnight events:   No acute event overnight.   Patient denied any dizziness or chest pain or palpitation.  Will have PPM placement with CT surgery today.         PAST MEDICAL & SURGICAL HISTORY:  Insomnia  HTN (hypertension)  No significant past surgical history      Allergies  No Known Allergies        MEDICATIONS  (STANDING):  enoxaparin Injectable 60 milliGRAM(s) SubCutaneous two times a day  melatonin 3 milliGRAM(s) Oral at bedtime  PARoxetine 20 milliGRAM(s) Oral daily  valsartan 160 milliGRAM(s) Oral daily    MEDICATIONS  (PRN):  meclizine 25 milliGRAM(s) Oral four times a day PRN Dizziness        Vital Signs Last 24 Hrs  T(C): 36.4 (10 May 2018 08:30), Max: 37 (09 May 2018 13:01)  T(F): 97.5 (10 May 2018 05:54), Max: 98.6 (09 May 2018 13:01)  HR: 50 (10 May 2018 09:03) (50 - 56)  BP: 133/64 (10 May 2018 08:30) (133/64 - 153/58)  BP(mean): --  RR: 18 (10 May 2018 08:30) (16 - 18)  SpO2: 94% (10 May 2018 09:03) (94% - 96%)    I&O's Summary    09 May 2018 07:01  -  10 May 2018 07:00  --------------------------------------------------------  IN: 1010 mL / OUT: 150 mL / NET: 860 mL          Physical Exam:    -     General : Not in acute distress.    -      HEENT: PEERLA    -      Cardiac: S1 & S2. No murmur/gallop/rubs.     -      Pulm: clear to auscultate b/l lung field.     -      GI: positive BS. No tenderness/rigidity/rebound.     -      Musculoskeletal: no pitting edema.     -      Neuro: A & O3.         Labs:                                   12.8   4.60  )-----------( 220      ( 10 May 2018 06:43 )             39.1             05-10    140  |  103  |  14  ----------------------------<  94  4.5   |  22  |  0.9    Ca    8.6      10 May 2018 06:43  Mg     2.1     05-09    TPro  6.5  /  Alb  3.8  /  TBili  0.4  /  DBili  x   /  AST  20  /  ALT  17  /  AlkPhos  42  05-10    LIVER FUNCTIONS - ( 10 May 2018 06:43 )  Alb: 3.8 g/dL / Pro: 6.5 g/dL / ALK PHOS: 42 U/L / ALT: 17 U/L / AST: 20 U/L / GGT: x         PT/INR - ( 09 May 2018 09:49 )   PT: 11.00 sec;   INR: 1.02 ratio         PTT - ( 09 May 2018 09:49 )  PTT:29.4 sec            Imaging:  Echo 5/7:  Normal global left ventricular systolic function. Mild mitral annular calcification.PSAP at least 38. Mild aortic regurgitation.      ECG:

## 2018-05-10 NOTE — PACU DISCHARGE NOTE - COMMENTS
S/P PPM  TRANSFERRED TO PACU  VSS . RESPONSIBILITIES OF CARE TRANSFERRED AND ACCEPOTED BY PACU NURSE AND DR GLORIA

## 2018-05-10 NOTE — PROGRESS NOTE ADULT - ASSESSMENT
87 yo F PMH of HTN, presents with severe dizziness for 2 to 3 years, which has previously been worked up without success.  Found to be in new onset a fib with RVR, which resolved on its own, followed by sinus bradycardia.      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  CTH neg.   - CE x 2 neg, electrolytes normal  - Echo: LVEF wnl. Mild aortic regurgitation.  - c/w pacing pads on patient.    - Avoid AV ousmane blocking agent for now.   - Holding lovenox for now (will resume Eliquis after PPM placement if CT surgery ok)  - Patient will be going for PPM placement today.   - f/u EP  - Cardio: restart Eliquis post-op, will add BB or Amio at the EP discretion post PPM.    Subclinical hypothyroidism  - TSH 7.67 but T3 and T4 wnl.  - will repeat TSH in 4 weeks.     Vertigo likely peripheral (resolved)  - CTH: No evidence of intracranial hemorrhage, territorial infarct, or mass effect. If symptoms persist, MRI is a more sensitive exam.  - c/w meclizine prn    HTN  - c/w valsartan    Insomnia  -c/w melatonin      # Heart healthy diet  # DVT prophylaxis (lovenox holding)

## 2018-05-10 NOTE — PROGRESS NOTE ADULT - ASSESSMENT
86 yof, PMH of HTN, presents with severe dizziness for 2 to 3 years, which has previously been worked up without success.  Found to be in new onset a fib with RVR, which resolved on its own, followed by sinus bradycardia.    1.	Tachy/ Chay syndrome/ A. Fib  2.	Dizziness due to #1  3.	HTN         PLAN:    ·	Scheduled for PPM today  ·	Card F/U noted  ·	Will restart her on Eliquis after the procedure  ·	Plan of care D/W the grandson on bedside.  ·	Cont tele for now

## 2018-05-11 ENCOUNTER — TRANSCRIPTION ENCOUNTER (OUTPATIENT)
Age: 83
End: 2018-05-11

## 2018-05-11 VITALS
HEART RATE: 61 BPM | DIASTOLIC BLOOD PRESSURE: 64 MMHG | RESPIRATION RATE: 18 BRPM | SYSTOLIC BLOOD PRESSURE: 147 MMHG | TEMPERATURE: 98 F

## 2018-05-11 LAB
BASOPHILS # BLD AUTO: 0.01 K/UL — SIGNIFICANT CHANGE UP (ref 0–0.2)
BASOPHILS NFR BLD AUTO: 0.2 % — SIGNIFICANT CHANGE UP (ref 0–1)
EOSINOPHIL # BLD AUTO: 0.1 K/UL — SIGNIFICANT CHANGE UP (ref 0–0.7)
EOSINOPHIL NFR BLD AUTO: 1.8 % — SIGNIFICANT CHANGE UP (ref 0–8)
HCT VFR BLD CALC: 38.8 % — SIGNIFICANT CHANGE UP (ref 37–47)
HGB BLD-MCNC: 12.9 G/DL — SIGNIFICANT CHANGE UP (ref 12–16)
IMM GRANULOCYTES NFR BLD AUTO: 0.2 % — SIGNIFICANT CHANGE UP (ref 0.1–0.3)
LYMPHOCYTES # BLD AUTO: 0.87 K/UL — LOW (ref 1.2–3.4)
LYMPHOCYTES # BLD AUTO: 16.1 % — LOW (ref 20.5–51.1)
MCHC RBC-ENTMCNC: 27.3 PG — SIGNIFICANT CHANGE UP (ref 27–31)
MCHC RBC-ENTMCNC: 33.2 G/DL — SIGNIFICANT CHANGE UP (ref 32–37)
MCV RBC AUTO: 82 FL — SIGNIFICANT CHANGE UP (ref 81–99)
MONOCYTES # BLD AUTO: 0.61 K/UL — HIGH (ref 0.1–0.6)
MONOCYTES NFR BLD AUTO: 11.3 % — HIGH (ref 1.7–9.3)
NEUTROPHILS # BLD AUTO: 3.81 K/UL — SIGNIFICANT CHANGE UP (ref 1.4–6.5)
NEUTROPHILS NFR BLD AUTO: 70.4 % — SIGNIFICANT CHANGE UP (ref 42.2–75.2)
NRBC # BLD: 0 /100 WBCS — SIGNIFICANT CHANGE UP (ref 0–0)
PLATELET # BLD AUTO: 209 K/UL — SIGNIFICANT CHANGE UP (ref 130–400)
RBC # BLD: 4.73 M/UL — SIGNIFICANT CHANGE UP (ref 4.2–5.4)
RBC # FLD: 13.3 % — SIGNIFICANT CHANGE UP (ref 11.5–14.5)
T3FREE SERPL-MCNC: 2.21 PG/ML — SIGNIFICANT CHANGE UP (ref 1.8–4.6)
T4 FREE SERPL-MCNC: 1.3 NG/DL — SIGNIFICANT CHANGE UP (ref 0.9–1.8)
WBC # BLD: 5.41 K/UL — SIGNIFICANT CHANGE UP (ref 4.8–10.8)
WBC # FLD AUTO: 5.41 K/UL — SIGNIFICANT CHANGE UP (ref 4.8–10.8)

## 2018-05-11 RX ORDER — ACETAMINOPHEN 500 MG
2 TABLET ORAL
Qty: 0 | Refills: 0 | COMMUNITY
Start: 2018-05-11

## 2018-05-11 RX ORDER — VALSARTAN 80 MG/1
1 TABLET ORAL
Qty: 0 | Refills: 0 | DISCHARGE
Start: 2018-05-11

## 2018-05-11 RX ORDER — VALSARTAN 80 MG/1
0 TABLET ORAL
Qty: 0 | Refills: 0 | COMMUNITY

## 2018-05-11 RX ORDER — ACETAMINOPHEN 500 MG
2 TABLET ORAL
Qty: 0 | Refills: 0 | DISCHARGE
Start: 2018-05-11 | End: 2018-05-14

## 2018-05-11 RX ORDER — RANITIDINE HYDROCHLORIDE 150 MG/1
1 TABLET, FILM COATED ORAL
Qty: 0 | Refills: 0 | COMMUNITY

## 2018-05-11 RX ORDER — MECLIZINE HCL 12.5 MG
1 TABLET ORAL
Qty: 56 | Refills: 0 | OUTPATIENT
Start: 2018-05-11 | End: 2018-05-24

## 2018-05-11 RX ORDER — APIXABAN 2.5 MG/1
1 TABLET, FILM COATED ORAL
Qty: 60 | Refills: 0
Start: 2018-05-11 | End: 2018-06-09

## 2018-05-11 RX ORDER — AMIODARONE HYDROCHLORIDE 400 MG/1
1 TABLET ORAL
Qty: 30 | Refills: 0 | OUTPATIENT
Start: 2018-05-11 | End: 2018-06-09

## 2018-05-11 RX ORDER — AMIODARONE HYDROCHLORIDE 400 MG/1
200 TABLET ORAL DAILY
Qty: 0 | Refills: 0 | Status: DISCONTINUED | OUTPATIENT
Start: 2018-05-11 | End: 2018-05-11

## 2018-05-11 RX ADMIN — Medication 100 MILLIGRAM(S): at 12:12

## 2018-05-11 RX ADMIN — Medication 100 MILLIGRAM(S): at 06:25

## 2018-05-11 RX ADMIN — VALSARTAN 160 MILLIGRAM(S): 80 TABLET ORAL at 06:25

## 2018-05-11 RX ADMIN — AMIODARONE HYDROCHLORIDE 200 MILLIGRAM(S): 400 TABLET ORAL at 12:08

## 2018-05-11 RX ADMIN — APIXABAN 2.5 MILLIGRAM(S): 2.5 TABLET, FILM COATED ORAL at 06:25

## 2018-05-11 RX ADMIN — APIXABAN 2.5 MILLIGRAM(S): 2.5 TABLET, FILM COATED ORAL at 16:46

## 2018-05-11 RX ADMIN — Medication 20 MILLIGRAM(S): at 12:08

## 2018-05-11 NOTE — PROGRESS NOTE ADULT - SUBJECTIVE AND OBJECTIVE BOX
DIANDRA GALLOWAY  86y Female    INTERVAL HPI/OVERNIGHT EVENTS:  Patient seen and examined. S/P PPM yesterday. Feels good. No SOB. No CP.    ROS: All other systems are negative.    Vital Signs:    T(F): 98 (18 @ 13:37), Max: 99.1 (05-10-18 @ 16:05)  HR: 61 (18 @ 13:37) (59 - 61)  BP: 147/64 (18 @ 13:37) (121/59 - 178/76)  RR: 18 (18 @ 13:37) (14 - 20)  SpO2: 95% (05-10-18 @ 16:35) (95% - 96%)  I&O's Summary    10 May 2018 07:01  -  11 May 2018 07:00  --------------------------------------------------------  IN: 320 mL / OUT: 0 mL / NET: 320 mL      Daily Height in cm: 154.94 (11 May 2018 08:33)    Daily Weight in k.2 (11 May 2018 06:11)  CAPILLARY BLOOD GLUCOSE          PHYSICAL EXAM:    GENERAL:  NAD  SKIN: No rashes or lesions  HENT: Atrumatic. Normocephalic. PERRL. Moist membranes.  NECK: Supple, No JVD. No lymphadenopathy.  PULMONARY: CTA B/L. No wheezing. No rales  CVS: Normal S1, S2. Rate and Rythm are regular. No murmurs.  ABDOMEN/GI: Soft, Nontender, Nondistended; BS present  EXTREMITIES: Peripheral pulses intact. No edema B/L LE.  NEUROLOGIC:  No motor or sensory deficit.  PSYCH: Alert & oriented x 3    Consultant(s) Notes Reviewed:  [x ] YES  [ ] NO  Care Discussed with Consultants/Other Providers [ x] YES  [ ] NO    EKG reviewed  Telemetry reviewed    LABS:                        12.9   5.41  )-----------( 209      ( 11 May 2018 09:53 )             38.8     05-10    140  |  103  |  14  ----------------------------<  94  4.5   |  22  |  0.9    Ca    8.6      10 May 2018 06:43    TPro  6.5  /  Alb  3.8  /  TBili  0.4  /  DBili  x   /  AST  20  /  ALT  17  /  AlkPhos  42  05-10              RADIOLOGY & ADDITIONAL TESTS:      Imaging or report Personally Reviewed:  [ ] YES  [ ] NO    Medications:  Standing  amiodarone    Tablet 200 milliGRAM(s) Oral daily  apixaban 2.5 milliGRAM(s) Oral every 12 hours  melatonin 3 milliGRAM(s) Oral at bedtime  PARoxetine 20 milliGRAM(s) Oral daily  valsartan 160 milliGRAM(s) Oral daily    PRN Meds  acetaminophen   Tablet. 650 milliGRAM(s) Oral every 4 hours PRN  meclizine 25 milliGRAM(s) Oral four times a day PRN      Case discussed with resident    Care discussed with pt/family

## 2018-05-11 NOTE — PROGRESS NOTE ADULT - SUBJECTIVE AND OBJECTIVE BOX
SUBJ:  Patient seen and examined. S/p PPM      MEDICATIONS  (STANDING):  apixaban 2.5 milliGRAM(s) Oral every 12 hours  ceFAZolin   IVPB 1000 milliGRAM(s) IV Intermittent every 8 hours  melatonin 3 milliGRAM(s) Oral at bedtime  PARoxetine 20 milliGRAM(s) Oral daily  sodium chloride 0.9%. 1000 milliLiter(s) (50 mL/Hr) IV Continuous <Continuous>  sodium chloride 0.9%. 1000 milliLiter(s) (20 mL/Hr) IV Continuous <Continuous>  valsartan 160 milliGRAM(s) Oral daily    MEDICATIONS  (PRN):  acetaminophen   Tablet. 650 milliGRAM(s) Oral every 4 hours PRN Moderate Pain (4 - 6)  meclizine 25 milliGRAM(s) Oral four times a day PRN Dizziness            Vital Signs Last 24 Hrs  T(C): 36.3 (11 May 2018 04:00), Max: 37.3 (10 May 2018 16:05)  T(F): 97.4 (11 May 2018 04:00), Max: 99.1 (10 May 2018 16:05)  HR: 60 (11 May 2018 04:00) (50 - 61)  BP: 153/69 (11 May 2018 04:00) (121/59 - 178/76)  BP(mean): --  RR: 18 (11 May 2018 04:00) (14 - 20)  SpO2: 95% (10 May 2018 16:35) (94% - 96%)      PHYSICAL EXAM:    GEN: AAO, NAD  HEENT: NC/AT  Neck: No JVD  CV: Reg, S1-S2, no murmur  Lungs: CTAB  Abd: Soft, non-tender  Ext: No edema      I&O's Summary    10 May 2018 07:01  -  11 May 2018 07:00  --------------------------------------------------------  IN: 320 mL / OUT: 0 mL / NET: 320 mL    	  LABS:                        12.8   4.60  )-----------( 220      ( 10 May 2018 06:43 )             39.1     05-10    140  |  103  |  14  ----------------------------<  94  4.5   |  22  |  0.9    Ca    8.6      10 May 2018 06:43  Mg     2.1     05-09    TPro  6.5  /  Alb  3.8  /  TBili  0.4  /  DBili  x   /  AST  20  /  ALT  17  /  AlkPhos  42  05-10        PT/INR - ( 09 May 2018 09:49 )   PT: 11.00 sec;   INR: 1.02 ratio         PTT - ( 09 May 2018 09:49 )  PTT:29.4 sec      BNP  RADIOLOGY & ADDITIONAL STUDIES:      IMPRESSION AND PLAN:

## 2018-05-11 NOTE — DISCHARGE NOTE ADULT - MEDICATION SUMMARY - MEDICATIONS TO TAKE
I will START or STAY ON the medications listed below when I get home from the hospital:    acetaminophen 325 mg oral tablet  -- 2 tab(s) by mouth every 4 hours, As needed, Moderate Pain (4 - 6)  -- Indication: For pain     valsartan 160 mg oral tablet  -- 1 tab(s) by mouth once a day  -- Indication: For Hypertension, unspecified type    amiodarone 200 mg oral tablet  -- 1 tab(s) by mouth once a day  -- Indication: For ATRIAL FIBRILLATION    apixaban 2.5 mg oral tablet  -- 1 tab(s) by mouth every 12 hours  -- Indication: For ATRIAL FIBRILLATION    PARoxetine 10 mg oral tablet  -- 1 tab(s) by mouth once a day   -- Do not drink alcoholic beverages when taking this medication.  Do not take this drug if you are pregnant.  It is very important that you take or use this exactly as directed.  Do not skip doses or discontinue unless directed by your doctor.  May cause drowsiness.  Alcohol may intensify this effect.  Use care when operating dangerous machinery.  Obtain medical advice before taking any non-prescription drugs as some may affect the action of this medication.  This drug may impair the ability to drive or operate machinery.  Use care until you become familiar with its effects.    -- Indication: For Anti-depressant    meclizine 25 mg oral tablet  -- 1 tab(s) by mouth every 6 hours, As Needed -Dizziness   -- Indication: For Dizziness    amLODIPine 5 mg oral tablet  -- 1 tab(s) by mouth once a day (at bedtime)  -- Indication: For Hypertension, unspecified type    Melatonin 5 mg oral tablet  -- 1 tab(s) by mouth once a day (at bedtime)  -- Indication: For Insomnia    NexIUM 40 mg oral delayed release capsule  -- 1 cap(s) by mouth once a day  -- Indication: For GI prophylaxis

## 2018-05-11 NOTE — PROGRESS NOTE ADULT - SUBJECTIVE AND OBJECTIVE BOX
Patient is a 86y old  Female who presents with a chief complaint of Dizziness for 2-3 years, worse on day of presentation (06 May 2018 08:32)      Overnight events:   No acute event overnight.   Patient is doing well. Pace maker placement by CTS yesterday.   Patient reports that the pain at PPM site has improved on tylenol today.  Spoke with EP regarding Amiodarone and Paxil interaction, and recommends that patient needs amiodarone.   Will get another CXR today for post - ppm placement. Will discharge if CXR neg for pneumothorax.         PAST MEDICAL & SURGICAL HISTORY:  Insomnia  HTN (hypertension)  No significant past surgical history      Allergies  No Known Allergies        MEDICATIONS  (STANDING):  amiodarone    Tablet 200 milliGRAM(s) Oral daily  apixaban 2.5 milliGRAM(s) Oral every 12 hours  ceFAZolin   IVPB 1000 milliGRAM(s) IV Intermittent every 8 hours  melatonin 3 milliGRAM(s) Oral at bedtime  PARoxetine 20 milliGRAM(s) Oral daily  valsartan 160 milliGRAM(s) Oral daily    MEDICATIONS  (PRN):  acetaminophen   Tablet. 650 milliGRAM(s) Oral every 4 hours PRN Moderate Pain (4 - 6)  meclizine 25 milliGRAM(s) Oral four times a day PRN Dizziness        Vital Signs Last 24 Hrs  T(C): 36.3 (11 May 2018 04:00), Max: 37.3 (10 May 2018 16:05)  T(F): 97.4 (11 May 2018 04:00), Max: 99.1 (10 May 2018 16:05)  HR: 60 (11 May 2018 04:00) (59 - 61)  BP: 153/69 (11 May 2018 04:00) (121/59 - 178/76)  BP(mean): --  RR: 18 (11 May 2018 04:00) (14 - 20)  SpO2: 95% (10 May 2018 16:35) (95% - 96%)    I&O's Summary    10 May 2018 07:01  -  11 May 2018 07:00  --------------------------------------------------------  IN: 320 mL / OUT: 0 mL / NET: 320 mL            Physical Exam:    -     General : Not in acute distress.    -      HEENT: PEERLA    -      Cardiac: S1 & S2. No murmur/gallop/rubs.     -      Pulm: clear to auscultate b/l lung field.     -      GI: positive BS. No tenderness/rigidity/rebound.     -      Musculoskeletal: no pitting edema. PPM placed on right upper chest.     -      Neuro: A & O3.         Labs:                                   12.9   5.41  )-----------( 209      ( 11 May 2018 09:53 )             38.8             05-10    140  |  103  |  14  ----------------------------<  94  4.5   |  22  |  0.9    Ca    8.6      10 May 2018 06:43    TPro  6.5  /  Alb  3.8  /  TBili  0.4  /  DBili  x   /  AST  20  /  ALT  17  /  AlkPhos  42  05-10    LIVER FUNCTIONS - ( 10 May 2018 06:43 )  Alb: 3.8 g/dL / Pro: 6.5 g/dL / ALK PHOS: 42 U/L / ALT: 17 U/L / AST: 20 U/L / GGT: x                      Imaging:  Echo 5/7:  Normal global left ventricular systolic function. Mild mitral annular calcification.PSAP at least 38. Mild aortic regurgitation.      ECG:

## 2018-05-11 NOTE — DISCHARGE NOTE ADULT - PATIENT PORTAL LINK FT
You can access the [facility] Patient Portal, offered by [facility], by registering with the following website: [email or web address]

## 2018-05-11 NOTE — DISCHARGE NOTE ADULT - CARE PLAN
Principal Discharge DX:	Tachycardia-bradycardia  Goal:	stable.  Assessment and plan of treatment:	A.fib with RVR with subsequent sinus bradycardia. s/p Pace maker placement.   Please continue taking medications as prescribed and follow up with Cardiology.  Secondary Diagnosis:	Dizziness  Goal:	resolved.  Assessment and plan of treatment:	Also complaints of vertigo, likely peripheral. Symptoms resolved with meclizine.   CTH was negative for acute cerebrovascular accident.   Please take the meclizine as needed and follow up with PMD.  If symptoms recurs or persists, consider MRI brain outpatient.  Secondary Diagnosis:	Essential hypertension  Goal:	stable  Assessment and plan of treatment:	Please continue taking medications as prescribed and follow up with primary medical doctor.  Secondary Diagnosis:	Subclinical hypothyroidism  Assessment and plan of treatment:	TSH 7.67 but T3 and T4 wnl.  Please follow up with primary medical doctor to repeat TSH in 4 weeks. Principal Discharge DX:	Tachycardia-bradycardia  Goal:	stable.  Assessment and plan of treatment:	A.fib with RVR with subsequent sinus bradycardia. s/p Pace maker placement.   Please continue taking medications as prescribed and follow up with Cardiology in 1-2 weeks. Please monitor EKG routinely to monitor QTc while patient is on both amiodarone and paxil.  Please follow up with Electrophysiology doctor (Dr. Tate, [facility]) in 6 weeks to check Pacemaker.  Secondary Diagnosis:	Dizziness  Goal:	resolved.  Assessment and plan of treatment:	Also complaints of vertigo, likely peripheral. Symptoms resolved with meclizine.   CTH was negative for acute cerebrovascular accident.   Please take the meclizine as needed and follow up with PMD.  If symptoms recurs or persists, consider MRI brain outpatient.  Secondary Diagnosis:	Essential hypertension  Goal:	stable  Assessment and plan of treatment:	Please continue taking medications as prescribed and follow up with primary medical doctor.  Secondary Diagnosis:	Subclinical hypothyroidism  Assessment and plan of treatment:	TSH 7.67 but T3 and T4 wnl.  Please follow up with primary medical doctor to repeat TSH in 4 weeks. Principal Discharge DX:	Tachycardia-bradycardia  Goal:	stable.  Assessment and plan of treatment:	A.fib with RVR with subsequent sinus bradycardia. s/p Pace maker placement.   Please continue taking medications as prescribed and follow up with Cardiology in 1-2 weeks. Please monitor EKG routinely to monitor QTc while patient is on both amiodarone and paxil.  Please follow up with Electrophysiology doctor (Dr. Tate, [facility]) in 6 weeks to check  the Pacemaker.  Secondary Diagnosis:	Dizziness  Goal:	resolved.  Assessment and plan of treatment:	Also complaints of vertigo, likely peripheral. Symptoms resolved with meclizine.   CTH was negative for acute cerebrovascular accident.   Please take the meclizine as needed and follow up with PMD.  If symptoms recurs or persists, consider MRI brain outpatient.  Secondary Diagnosis:	Essential hypertension  Goal:	stable  Assessment and plan of treatment:	Please continue taking medications as prescribed and follow up with primary medical doctor.  Secondary Diagnosis:	Subclinical hypothyroidism  Assessment and plan of treatment:	TSH 7.67 but T3 and T4 wnl.  Please follow up with primary medical doctor to repeat TSH in 4 weeks.

## 2018-05-11 NOTE — PROGRESS NOTE ADULT - ASSESSMENT
87 yo F PMH of HTN, presents with severe dizziness for 2 to 3 years, which has previously been worked up without success.  Found to be in new onset a fib with RVR, which resolved on its own, followed by sinus bradycardia.      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  CTH neg.   - CE x 2 neg, electrolytes normal  - Echo: LVEF wnl. Mild aortic regurgitation.  - c/w pacing pads on patient.    -started on eliquis 2.5mg q12h.   - Patient will be going for PPM placement today.   - EP : dc home with outpt follow up in 6 wks, amiodarone 200mg q24h.   - Cardio: dc home today, amiodarone.     Subclinical hypothyroidism  - TSH 7.67 but T3 and T4 wnl.  - will repeat TSH in 4 weeks.     Vertigo likely peripheral (resolved)  - CTH: No evidence of intracranial hemorrhage, territorial infarct, or mass effect. If symptoms persist, MRI is a more sensitive exam.  - c/w meclizine prn    HTN  - c/w valsartan    Insomnia  -c/w melatonin      # Heart healthy diet  # DVT prophylaxis (on Eliquis)

## 2018-05-11 NOTE — DISCHARGE NOTE ADULT - CARE PROVIDER_API CALL
Sharmaine Freitas), Cardiology; Internal Medicine  [address] [place] [address]  Phone: [phone]  Fax: [phone]    Jie Mckeon  Cardiologist  Phone: (   )    -  Fax: (   )    -

## 2018-05-11 NOTE — DISCHARGE NOTE ADULT - HOSPITAL COURSE
85 yo F PMH of HTN, presents with severe dizziness for 2 to 3 years, which has previously been worked up without success.  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 Pace make was placed by Cardiothoracic surgery team without complications. Patient was started on eliquis and will need to continue taking it. Echocardiogram is unremarkable.  Patient's dizziness/ vertigo resolved with meclizine. CTH was negative for acute cerebrovascular accident. Likely peripheral vertigo. Patient is non focal on physical exam. If symptoms recurs/persists, consider MRI brain outpatient.   Upon discharge, Please follow up with cardiology and primary medical doctor in 1-2 weeks. 85 yo F PMH of HTN, presents with severe dizziness for 2 to 3 years, which has previously been worked up without success.  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 Pace make was placed by Cardiothoracic surgery team without complications. Patient was started on eliquis and will need to continue taking it. Echocardiogram is unremarkable.  Patient's dizziness/ vertigo resolved with meclizine. CTH was negative for acute cerebrovascular accident. Likely peripheral vertigo. Patient is non focal on physical exam. If symptoms recurs/persists, consider MRI brain outpatient.   Upon discharge, Please follow up with cardiology and primary medical doctor in 1-2 weeks. Please follow up with Electrophysiologist (Sharmaine Zaragoza) in 6 weeks to check pacemaker.

## 2018-05-11 NOTE — PROGRESS NOTE ADULT - PROVIDER SPECIALTY LIST ADULT
Cardiology
Hospitalist
Hospitalist
Internal Medicine

## 2018-05-11 NOTE — DISCHARGE NOTE ADULT - PROVIDER TOKENS
TOKEN:'[id number]',FREE:[LAST:[Li],FIRST:[Jie Walker],PHONE:[(   )    -],FAX:[(   )    -],ADDRESS:[Cardiologist]]

## 2018-05-11 NOTE — DISCHARGE NOTE ADULT - MEDICATION SUMMARY - MEDICATIONS TO CHANGE
I will SWITCH the dose or number of times a day I take the medications listed below when I get home from the hospital:    PARoxetine 20 mg oral tablet  -- 1 tab(s) by mouth once a day

## 2018-05-11 NOTE — DISCHARGE NOTE ADULT - PLAN OF CARE
stable. A.fib with RVR with subsequent sinus bradycardia. s/p Pace maker placement.   Please continue taking medications as prescribed and follow up with Cardiology. resolved. Also complaints of vertigo, likely peripheral. Symptoms resolved with meclizine.   CTH was negative for acute cerebrovascular accident.   Please take the meclizine as needed and follow up with PMD.  If symptoms recurs or persists, consider MRI brain outpatient. stable Please continue taking medications as prescribed and follow up with primary medical doctor. TSH 7.67 but T3 and T4 wnl.  Please follow up with primary medical doctor to repeat TSH in 4 weeks. A.fib with RVR with subsequent sinus bradycardia. s/p Pace maker placement.   Please continue taking medications as prescribed and follow up with Cardiology in 1-2 weeks. Please monitor EKG routinely to monitor QTc while patient is on both amiodarone and paxil.  Please follow up with Electrophysiology doctor (Sharmaine Zaragoza) in 6 weeks to check Pacemaker. A.fib with RVR with subsequent sinus bradycardia. s/p Pace maker placement.   Please continue taking medications as prescribed and follow up with Cardiology in 1-2 weeks. Please monitor EKG routinely to monitor QTc while patient is on both amiodarone and paxil.  Please follow up with Electrophysiology doctor (Sharmaine Zaragoza) in 6 weeks to check  the Pacemaker.

## 2018-05-11 NOTE — PROGRESS NOTE ADULT - ASSESSMENT
S/p PPM placement for tachy-haley syndrome  No PTX on CXR  In NSR  PAF - on Apixaban 2.5 mg q12  Add amiodarone 200 mg q 24 as per EP.  C/w valsartan  Outpatient follow-up with EP and cardiology.  D/c today if ok with surgery.

## 2018-05-11 NOTE — PROGRESS NOTE ADULT - ASSESSMENT
86 yof, PMH of HTN, presents with severe dizziness for 2 to 3 years, which has previously been worked up without success.  Found to be in new onset a fib with RVR, which resolved on its own, followed by sinus bradycardia.    1.	Tachy/ Chay syndrome/ A. Fib  2.	Dizziness due to #1  3.	HTN  4.	S/P PPM         PLAN:    ·	No PTX on CXR. S/P PPM interrogation. Cleared by EP and Card to D/C home  ·	Card F/U noted  ·	Cont her eliquis.  ·	Started on amiodarone as per EP. Will decrease the dose of paroxetine to 10 mg po q 24h.    * Med rec reviewed with the intern. Time spent 41 minutes.

## 2018-05-25 DIAGNOSIS — I49.5 SICK SINUS SYNDROME: ICD-10-CM

## 2018-05-25 DIAGNOSIS — R42 DIZZINESS AND GIDDINESS: ICD-10-CM

## 2018-05-25 DIAGNOSIS — H81.399 OTHER PERIPHERAL VERTIGO, UNSPECIFIED EAR: ICD-10-CM

## 2018-05-25 DIAGNOSIS — E03.8 OTHER SPECIFIED HYPOTHYROIDISM: ICD-10-CM

## 2018-05-25 DIAGNOSIS — G47.00 INSOMNIA, UNSPECIFIED: ICD-10-CM

## 2018-05-25 DIAGNOSIS — I10 ESSENTIAL (PRIMARY) HYPERTENSION: ICD-10-CM

## 2018-05-25 DIAGNOSIS — I48.0 PAROXYSMAL ATRIAL FIBRILLATION: ICD-10-CM

## 2018-09-26 NOTE — PRE-ANESTHESIA EVALUATION ADULT - BSA (M2)
Chief Complaint Patient presents with  Follow-up 3 month; patient is fasting  Immunization/Injection Influenza 1. Have you been to the ER, urgent care clinic since your last visit? Hospitalized since your last visit? No 
 
2. Have you seen or consulted any other health care providers outside of the [address] [place] [address] since your last visit? Include any pap smears or colon screening.  No  
 
 

1.59
1.59

## 2019-04-24 PROBLEM — I10 ESSENTIAL (PRIMARY) HYPERTENSION: Chronic | Status: ACTIVE | Noted: 2018-05-06

## 2019-04-24 PROBLEM — G47.00 INSOMNIA, UNSPECIFIED: Chronic | Status: ACTIVE | Noted: 2018-05-06

## 2019-09-04 ENCOUNTER — APPOINTMENT (OUTPATIENT)
Dept: CARDIOLOGY | Facility: CLINIC | Age: 84
End: 2019-09-04
Payer: MEDICARE

## 2019-09-04 PROCEDURE — 99213 OFFICE O/P EST LOW 20 MIN: CPT | Mod: 25

## 2019-09-04 PROCEDURE — 93280 PM DEVICE PROGR EVAL DUAL: CPT | Mod: 59

## 2020-01-13 ENCOUNTER — APPOINTMENT (OUTPATIENT)
Dept: CARDIOLOGY | Facility: CLINIC | Age: 85
End: 2020-01-13
Payer: MEDICARE

## 2020-01-13 PROCEDURE — 93280 PM DEVICE PROGR EVAL DUAL: CPT | Mod: 59

## 2020-01-13 PROCEDURE — 99213 OFFICE O/P EST LOW 20 MIN: CPT | Mod: 25

## 2020-06-05 PROBLEM — Z00.00 ENCOUNTER FOR PREVENTIVE HEALTH EXAMINATION: Status: ACTIVE | Noted: 2020-06-05

## 2020-07-24 ENCOUNTER — RECORD ABSTRACTING (OUTPATIENT)
Age: 85
End: 2020-07-24

## 2020-07-24 DIAGNOSIS — Z86.79 PERSONAL HISTORY OF OTHER DISEASES OF THE CIRCULATORY SYSTEM: ICD-10-CM

## 2020-07-24 DIAGNOSIS — I35.1 NONRHEUMATIC AORTIC (VALVE) INSUFFICIENCY: ICD-10-CM

## 2020-07-24 DIAGNOSIS — Z78.9 OTHER SPECIFIED HEALTH STATUS: ICD-10-CM

## 2020-10-26 ENCOUNTER — APPOINTMENT (OUTPATIENT)
Dept: CARDIOLOGY | Facility: CLINIC | Age: 85
End: 2020-10-26
Payer: MEDICARE

## 2020-10-26 VITALS
HEIGHT: 60 IN | DIASTOLIC BLOOD PRESSURE: 70 MMHG | SYSTOLIC BLOOD PRESSURE: 150 MMHG | TEMPERATURE: 98.4 F | BODY MASS INDEX: 29.25 KG/M2 | HEART RATE: 70 BPM | WEIGHT: 149 LBS

## 2020-10-26 VITALS — SYSTOLIC BLOOD PRESSURE: 152 MMHG | DIASTOLIC BLOOD PRESSURE: 78 MMHG

## 2020-10-26 DIAGNOSIS — Z86.79 PERSONAL HISTORY OF OTHER DISEASES OF THE CIRCULATORY SYSTEM: ICD-10-CM

## 2020-10-26 DIAGNOSIS — Z87.898 PERSONAL HISTORY OF OTHER SPECIFIED CONDITIONS: ICD-10-CM

## 2020-10-26 PROCEDURE — 99215 OFFICE O/P EST HI 40 MIN: CPT | Mod: 25

## 2020-10-26 PROCEDURE — 93288 INTERROG EVL PM/LDLS PM IP: CPT

## 2020-10-26 PROCEDURE — 99072 ADDL SUPL MATRL&STAF TM PHE: CPT

## 2020-10-26 PROCEDURE — 93000 ELECTROCARDIOGRAM COMPLETE: CPT | Mod: 59

## 2020-10-26 NOTE — HISTORY OF PRESENT ILLNESS
[de-identified] : 	8/15/18\par 86 YRS OLD POST DISCHARGE FROM [facility] ON MAY 10 TH 2018 FOR WEAKNESS ,LIGHTHEADEDNESS,BUT NO SYNCOPE PT HAD DDD [employer] FOR SSS .NOW DENIES ANY CARDIAC C/O AND SON SAID SHE FEELS MUCH BETTER SINCE HAD PPM PAST MEDICAL HISTORY OF HYPERTENSION FOR MANY YRS 8/15/17\par \par NO CARDIAC C/O 12/12/18\par \par NO CARDIAC C/O 4/10/19\par \par NO CARDIAC C/O, LEFT SHOULDER PAIN. 9/04/19\par \par NO CARDIAC C/O, LEFT SHOULDER PAIN. 01/13/2020 \par \par NO CARDIAC C/O TODAY 10/26/20

## 2020-10-26 NOTE — PHYSICAL EXAM
[General Appearance - Well Developed] : well developed [Normal Appearance] : normal appearance [Well Groomed] : well groomed [General Appearance - Well Nourished] : well nourished [No Deformities] : no deformities [General Appearance - In No Acute Distress] : no acute distress [Systolic grade ___/6] : A grade [unfilled]/6 systolic murmur was heard. [Nail Clubbing] : no clubbing of the fingernails [Cyanosis, Localized] : no localized cyanosis [Petechial Hemorrhages (___cm)] : no petechial hemorrhages [] : no ischemic changes [FreeTextEntry1] : BILATERAL DECREASED AIR ENTRY, DENIES SOB

## 2020-10-26 NOTE — PROCEDURE
[No] : not [NSR] : normal sinus rhythm [Pacemaker] : pacemaker [Longevity: ___ months] : The estimated remaining battery life is [unfilled] months [Lead Imp:  ___ohms] : lead impedance was [unfilled] ohms [Sensing Amplitude ___mv] : sensing amplitude was [unfilled] mv [___V @] : [unfilled] V [___ ms] : [unfilled] ms [de-identified] : [employer] [de-identified] : JOCELYNN [de-identified] : [id number] [de-identified] : 5-10-18 [de-identified] : AAI [de-identified] : 70 [de-identified] : %\par  0%

## 2020-10-26 NOTE — ASSESSMENT
[FreeTextEntry1] : EKG SR 86/MIN APACING, \par DIFFUSE AMPLITUDE CHANGE \par NO EVENTS \par PROGRAMMED OFF ATRIAL PREFERENTIAL \par NO NEED FOR RATE RESPONSE \par NO PACER DEPENDANT\par \par PLAN \par F/U 3 MONTHS

## 2020-10-27 ENCOUNTER — RESULT CHARGE (OUTPATIENT)
Age: 85
End: 2020-10-27

## 2020-12-02 ENCOUNTER — APPOINTMENT (OUTPATIENT)
Dept: CARDIOLOGY | Facility: CLINIC | Age: 85
End: 2020-12-02

## 2021-01-27 ENCOUNTER — APPOINTMENT (OUTPATIENT)
Dept: CARDIOLOGY | Facility: CLINIC | Age: 86
End: 2021-01-27

## 2021-02-23 ENCOUNTER — NON-APPOINTMENT (OUTPATIENT)
Age: 86
End: 2021-02-23

## 2021-02-23 ENCOUNTER — APPOINTMENT (OUTPATIENT)
Dept: CARDIOLOGY | Facility: CLINIC | Age: 86
End: 2021-02-23
Payer: MEDICARE

## 2021-02-23 PROCEDURE — 93294 REM INTERROG EVL PM/LDLS PM: CPT

## 2021-02-23 PROCEDURE — 93296 REM INTERROG EVL PM/IDS: CPT

## 2021-04-26 ENCOUNTER — APPOINTMENT (OUTPATIENT)
Dept: CARDIOLOGY | Facility: CLINIC | Age: 86
End: 2021-04-26

## 2021-06-04 ENCOUNTER — EMERGENCY (EMERGENCY)
Facility: HOSPITAL | Age: 86
LOS: 0 days | Discharge: HOME | End: 2021-06-04
Attending: EMERGENCY MEDICINE | Admitting: EMERGENCY MEDICINE
Payer: MEDICARE

## 2021-06-04 VITALS
OXYGEN SATURATION: 98 % | WEIGHT: 139.99 LBS | HEART RATE: 66 BPM | SYSTOLIC BLOOD PRESSURE: 140 MMHG | HEIGHT: 61 IN | TEMPERATURE: 97 F | RESPIRATION RATE: 18 BRPM | DIASTOLIC BLOOD PRESSURE: 59 MMHG

## 2021-06-04 DIAGNOSIS — Z79.899 OTHER LONG TERM (CURRENT) DRUG THERAPY: ICD-10-CM

## 2021-06-04 DIAGNOSIS — M54.5 LOW BACK PAIN: ICD-10-CM

## 2021-06-04 DIAGNOSIS — Z95.0 PRESENCE OF CARDIAC PACEMAKER: ICD-10-CM

## 2021-06-04 DIAGNOSIS — Z95.0 PRESENCE OF CARDIAC PACEMAKER: Chronic | ICD-10-CM

## 2021-06-04 DIAGNOSIS — I48.91 UNSPECIFIED ATRIAL FIBRILLATION: ICD-10-CM

## 2021-06-04 DIAGNOSIS — I10 ESSENTIAL (PRIMARY) HYPERTENSION: ICD-10-CM

## 2021-06-04 DIAGNOSIS — Z86.69 PERSONAL HISTORY OF OTHER DISEASES OF THE NERVOUS SYSTEM AND SENSE ORGANS: ICD-10-CM

## 2021-06-04 DIAGNOSIS — Z79.01 LONG TERM (CURRENT) USE OF ANTICOAGULANTS: ICD-10-CM

## 2021-06-04 PROCEDURE — 72192 CT PELVIS W/O DYE: CPT | Mod: 26,ME

## 2021-06-04 PROCEDURE — 99284 EMERGENCY DEPT VISIT MOD MDM: CPT

## 2021-06-04 PROCEDURE — G1004: CPT

## 2021-06-04 RX ORDER — ACETAMINOPHEN 500 MG
975 TABLET ORAL ONCE
Refills: 0 | Status: COMPLETED | OUTPATIENT
Start: 2021-06-04 | End: 2021-06-04

## 2021-06-04 RX ORDER — FAMOTIDINE 10 MG/ML
20 INJECTION INTRAVENOUS ONCE
Refills: 0 | Status: COMPLETED | OUTPATIENT
Start: 2021-06-04 | End: 2021-06-04

## 2021-06-04 RX ADMIN — Medication 975 MILLIGRAM(S): at 08:04

## 2021-06-04 RX ADMIN — FAMOTIDINE 20 MILLIGRAM(S): 10 INJECTION INTRAVENOUS at 08:04

## 2021-06-04 RX ADMIN — Medication 40 MILLIGRAM(S): at 08:04

## 2021-06-04 NOTE — ED ADULT NURSE NOTE - OBJECTIVE STATEMENT
As per grand Son, pt has being experiencing lower back pains for 1months. She used to get PT, but is no more effective. Pt wants more evaluation. Pt is unable ambulates now, need assistance to even get up. Pain is sharp, radiating to lower buttocks..

## 2021-06-04 NOTE — ED ADULT TRIAGE NOTE - BMI (KG/M2)
Please contact our office if you have any questions about your visit today.     1.) Please get labs a week before next visit    2.) Return in 3 months for follow up
26.5

## 2021-06-04 NOTE — ED PROVIDER NOTE - NS ED ROS FT
Review of Systems:  	•	CONSTITUTIONAL - no fever, no diaphoresis  	•	SKIN - no rash, no lesions  	•	HEMATOLOGIC - no bleeding, no bruising  	•	EYES - no discharge, no injection  	•	ENT - no sore throat, no runny nose  	•	RESPIRATORY - no shortness of breath, no cough  	•	CARDIAC - no chest pain, no palpitations  	•	GI - no abd pain, no nausea, no vomiting, no diarrhea  	•	GENITO-URINARY - no dysuria, no hematuria  	•	MUSCULOSKELETAL - +back pain, no trauma  	•	NEUROLOGIC - no dizziness, no headache

## 2021-06-04 NOTE — ED PROVIDER NOTE - CARE PROVIDER_API CALL
Héctor Quevedo  [id number]  [address], [place],    Phone: [phone]  Fax: [phone]  Established Patient  Follow Up Time: 1-3 Days

## 2021-06-04 NOTE — ED PROVIDER NOTE - ATTENDING CONTRIBUTION TO CARE
89 F hx of afib, ppm, on eliquis, htn, 89 F hx of afib, ppm, on eliquis, htn, now with 1 month of lower back pain , bilat on the sides, worse with movt and bending forward, better with sitting up.  seen by acupuncture.  still with pain. no abd pain. no syncope, no urinary symptoms. no incontinence, ambulatory   vss, elderly female, grandson at bedside, nontoxic, well appearing, pink conj, anicteric, MMM, neck supple, no crepitus, CTAB, RRR, equal radial pulses bilat, abd soft/nt/nd, no pulsatile mass, no cva tend. no calves tend, no edema, no fnd. no rashes. gait is normal, but obviously painful when getting up, + SI joints bilat tenderness, no midline c-t-l spine tend, FROM x4  - supportive care, imaging

## 2021-06-04 NOTE — ED PROVIDER NOTE - NSFOLLOWUPINSTRUCTIONS_ED_ALL_ED_FT
Please follow up with your primary care doctor in 1-3 days for further evaluation. You can follow up with the [facility]. Return to the emergency department sooner for any new or worsening symptoms.    Back Pain    Back pain is very common in adults. The cause of back pain is rarely dangerous and the pain often gets better over time. The cause of your back pain may not be known and may include strain of muscles or ligaments, degeneration of the spinal disks, or arthritis. Occasionally the pain may radiate down your leg(s). Over-the-counter medicines to reduce pain and inflammation are often the most helpful. Stretching and remaining active frequently helps the healing process.     SEEK IMMEDIATE MEDICAL CARE IF YOU HAVE THE FOLLOWING SYMPTOMS: bowel or bladder control problems, unusual weakness or numbness in your arms or legs, nausea or vomiting, abdominal pain, fever, dizziness/lightheadedness.

## 2021-06-04 NOTE — ED PROVIDER NOTE - PHYSICAL EXAMINATION
Vital Signs: Reviewed  GEN: alert, NAD, speaks full sentences  HEAD:  normocephalic, atraumatic  EYES:  PERRLA; conjunctivae without injection, drainage or discharge  ENMT:  nasal mucosa moist; mouth moist without ulcerations or lesions; throat moist without erythema, exudate, ulcerations or lesions  NECK:  supple, no midline tenderness  CARDIAC:  regular rate, normal S1 and S2, no murmurs  RESP:  respiratory rate and effort appear normal for age; lungs are clear to auscultation bilaterally; no rales or wheezes  ABDOMEN:  soft, nontender, nondistended  MUSCULOSKELETAL/NEURO: tenderness over b/l SI joints, no midline spinal tenderness, sensation intact, FROM all 4 extremities, ambulates w/ cane; normal movement, normal tone  SKIN:  normal skin color for age and race, well-perfused; warm and dry

## 2021-06-04 NOTE — ED PROVIDER NOTE - OBJECTIVE STATEMENT
89yF pmhx HTN, s/p PPM c/o lower back back x3-4wks; constant, non-radiating; per grandson, pt states pain started spontaneously w/o trauma, is b/l, does not radiate down back of legs; not associated w/ numbness in genital region or incontinence/retention of urine/stool; mildly alleviated by leaning forward; denies fever/chills, chest pain, SOB, abd pain, n/v/d. Has tried accupuncture w/ minimal relief.

## 2021-06-04 NOTE — ED PROVIDER NOTE - PATIENT PORTAL LINK FT
You can access the FollowMyHealth Patient Portal offered by [facility] by registering at the following website: [email or web address]. By joining [employer]’s FollowMyHealth portal, you will also be able to view your health information using other applications (apps) compatible with our system.

## 2021-07-20 NOTE — PRE-OP CHECKLIST - HAND OFF
Received faxed refill request from [facility], requesting Lactulose (Chronulac 10 gm15 ml soln refilled.  Reviewed chart.  Dr. Perry was notified.  V.O. Dr. Perry/[facility] [address]s office to review this request, to determine need for any orders.  Pt. is currently on Xeloda treatments.  Need GI MD to determine if this medication should be refilled.      Routed to Dr. Segovia's office.   yes

## 2021-07-27 ENCOUNTER — APPOINTMENT (OUTPATIENT)
Dept: CARDIOLOGY | Facility: CLINIC | Age: 86
End: 2021-07-27
Payer: MEDICARE

## 2021-07-27 ENCOUNTER — NON-APPOINTMENT (OUTPATIENT)
Age: 86
End: 2021-07-27

## 2021-07-27 PROCEDURE — 93296 REM INTERROG EVL PM/IDS: CPT

## 2021-07-27 PROCEDURE — 93294 REM INTERROG EVL PM/LDLS PM: CPT

## 2021-08-02 ENCOUNTER — APPOINTMENT (OUTPATIENT)
Dept: CARDIOLOGY | Facility: CLINIC | Age: 86
End: 2021-08-02

## 2021-09-20 ENCOUNTER — APPOINTMENT (OUTPATIENT)
Dept: CARDIOLOGY | Facility: CLINIC | Age: 86
End: 2021-09-20
Payer: MEDICARE

## 2021-09-20 VITALS
HEART RATE: 59 BPM | SYSTOLIC BLOOD PRESSURE: 120 MMHG | HEIGHT: 60 IN | BODY MASS INDEX: 28.47 KG/M2 | TEMPERATURE: 97.2 F | OXYGEN SATURATION: 94 % | WEIGHT: 145 LBS | DIASTOLIC BLOOD PRESSURE: 60 MMHG

## 2021-09-20 DIAGNOSIS — I48.91 UNSPECIFIED ATRIAL FIBRILLATION: ICD-10-CM

## 2021-09-20 PROCEDURE — 93280 PM DEVICE PROGR EVAL DUAL: CPT

## 2021-09-20 PROCEDURE — 93000 ELECTROCARDIOGRAM COMPLETE: CPT | Mod: 59

## 2021-09-20 PROCEDURE — 99213 OFFICE O/P EST LOW 20 MIN: CPT

## 2021-09-20 RX ORDER — LINACLOTIDE 145 UG/1
145 CAPSULE, GELATIN COATED ORAL
Refills: 0 | Status: DISCONTINUED | COMMUNITY
End: 2021-09-20

## 2021-09-20 RX ORDER — SULFAMETHOXAZOLE AND TRIMETHOPRIM 800; 160 MG/1; MG/1
800-160 TABLET ORAL
Refills: 0 | Status: DISCONTINUED | COMMUNITY
End: 2021-09-20

## 2021-09-20 RX ORDER — ERGOCALCIFEROL 1.25 MG/1
1.25 MG CAPSULE, LIQUID FILLED ORAL
Refills: 0 | Status: DISCONTINUED | COMMUNITY
End: 2021-09-20

## 2021-09-20 RX ORDER — DOCUSATE SODIUM 100 MG
100 TABLET ORAL
Refills: 0 | Status: DISCONTINUED | COMMUNITY
End: 2021-09-20

## 2021-09-20 RX ORDER — NICOTINE POLACRILEX 2 MG
LOZENGE BUCCAL
Refills: 0 | Status: DISCONTINUED | COMMUNITY
End: 2021-09-20

## 2021-09-20 NOTE — PROCEDURE
[No] : not [NSR] : normal sinus rhythm [Pacemaker] : pacemaker [Voltage: ___ volts] : Voltage was [unfilled] volts [Longevity: ___ months] : The estimated remaining battery life is [unfilled] months [Threshold Testing Performed] : Threshold testing was performed [Lead Imp:  ___ohms] : lead impedance was [unfilled] ohms [Sensing Amplitude ___mv] : sensing amplitude was [unfilled] mv [___V @] : [unfilled] V [___ ms] : [unfilled] ms [Programmed for Longevity] : output reprogrammed for improved battery longevity [Apace-Vpace ___ %] : Apace-Vpace [unfilled]% [See Device Printout] : See device printout [de-identified] : [employer] [de-identified] : JOCELYNN [de-identified] : 5/10/18 [de-identified] : [id number] [de-identified] : AAI [de-identified] : 70 [de-identified] : No events\par No AFib on interrogate all

## 2021-09-20 NOTE — HISTORY OF PRESENT ILLNESS
[de-identified] : \par PCP: Dr. Héctor Quevedo\par \par 88 yo Cantonese speaking F with history of HTN, SSS s/p DC PPM implanted by Dr. Frias in 2018 and atrial fibrillation here for routine pacemaker evaluation. She was admitted 5/6-5/11/18 for dizziness. The patient denies any cardiovascular complaints including chest pain, dyspnea, palpitations, dizziness, lightheadedness, presyncope or syncope.\par \par Daughter states her mother had a blood clot in her left leg, for which she is on Xarelto. This was 2 years ago. \par

## 2021-09-20 NOTE — REASON FOR VISIT
[___ Device Check] : is here today for a [unfilled] device check for [Other ___] : [unfilled] [Other: _____] : [unfilled] [Time Spent: ____ minutes] : Total time spent using  services: [unfilled] minutes. The patient's primary language is not English thus required  services. [Interpreters_IDNumber] : [id number] [Interpreters_FullName] : Buck [TWNoteComboBox1] : Aubree

## 2021-09-20 NOTE — ASSESSMENT
[FreeTextEntry1] : # SSS s/p DC PPM (2018)\par - Normal functioning PPM \par - Remote monitor is set up and patient is transmitting.\par \par # Paroxysmal AFib\par - None seen on interrogate all. Carelink reviewed and paroxysmal AF seen in Sept 2019. \par - Patient is also on Amio 100mg daily and Xarelto 10 per primary. \par - Asked pt to follow up with ophtho and pulm. Referrals provided. \par - Obtain recent labs (done last week)\par - Check 2D echo; ordered but pt refused to proceed with echo\par \par # H/o DVT?\par - On Xarelto according to the daughter \par \par # HTN\par - BP well controlled\par - Cont Amlodipine and Telmisartan\par - 2g Na diet enforced\par \par I have also advised the patient to go to the nearest emergency room if she experiences any chest pain, dyspnea, syncope, or has any other compelling symptoms. \par \par Follow up in 9-12 mo

## 2021-09-20 NOTE — PHYSICAL EXAM
[General Appearance - Well Developed] : well developed [Normal Appearance] : normal appearance [Well Groomed] : well groomed [General Appearance - Well Nourished] : well nourished [No Deformities] : no deformities [General Appearance - In No Acute Distress] : no acute distress [Heart Rate And Rhythm] : heart rate and rhythm were normal [Heart Sounds] : normal S1 and S2 [Murmurs] : no murmurs present [] : no respiratory distress [Respiration, Rhythm And Depth] : normal respiratory rhythm and effort [Exaggerated Use Of Accessory Muscles For Inspiration] : no accessory muscle use [Auscultation Breath Sounds / Voice Sounds] : lungs were clear to auscultation bilaterally [Left Infraclavicular] : left infraclavicular area [Well-Healed] : well-healed [Abdomen Soft] : soft [Cyanosis, Localized] : no localized cyanosis [Nail Clubbing] : no clubbing of the fingernails

## 2021-10-26 ENCOUNTER — APPOINTMENT (OUTPATIENT)
Dept: CARDIOLOGY | Facility: CLINIC | Age: 86
End: 2021-10-26
Payer: MEDICARE

## 2021-10-26 ENCOUNTER — NON-APPOINTMENT (OUTPATIENT)
Age: 86
End: 2021-10-26

## 2021-10-26 PROCEDURE — 93296 REM INTERROG EVL PM/IDS: CPT | Mod: NC

## 2021-10-26 PROCEDURE — 93294 REM INTERROG EVL PM/LDLS PM: CPT

## 2022-01-25 ENCOUNTER — NON-APPOINTMENT (OUTPATIENT)
Age: 87
End: 2022-01-25

## 2022-01-25 ENCOUNTER — APPOINTMENT (OUTPATIENT)
Dept: CARDIOLOGY | Facility: CLINIC | Age: 87
End: 2022-01-25
Payer: MEDICARE

## 2022-01-25 PROCEDURE — 93296 REM INTERROG EVL PM/IDS: CPT

## 2022-01-25 PROCEDURE — 93294 REM INTERROG EVL PM/LDLS PM: CPT

## 2022-04-26 ENCOUNTER — APPOINTMENT (OUTPATIENT)
Dept: CARDIOLOGY | Facility: CLINIC | Age: 87
End: 2022-04-26

## 2022-04-26 ENCOUNTER — FORM ENCOUNTER (OUTPATIENT)
Age: 87
End: 2022-04-26

## 2022-05-25 ENCOUNTER — APPOINTMENT (OUTPATIENT)
Dept: CARDIOLOGY | Facility: CLINIC | Age: 87
End: 2022-05-25
Payer: MEDICARE

## 2022-05-25 ENCOUNTER — NON-APPOINTMENT (OUTPATIENT)
Age: 87
End: 2022-05-25

## 2022-05-25 PROCEDURE — 93294 REM INTERROG EVL PM/LDLS PM: CPT

## 2022-05-25 PROCEDURE — 93296 REM INTERROG EVL PM/IDS: CPT

## 2022-08-14 NOTE — ED PROVIDER NOTE - NSFOLLOWUPCLINICS_GEN_ALL_ED_FT
Patient/Caregiver provided printed discharge information.
[facility] ([facility])  Rehabilitation  [facility] [address], [address] [place] [address]  Phone: [phone]  Fax:   Follow Up Time: 1-3 Days

## 2022-08-23 ENCOUNTER — FORM ENCOUNTER (OUTPATIENT)
Age: 87
End: 2022-08-23

## 2022-08-24 ENCOUNTER — APPOINTMENT (OUTPATIENT)
Dept: CARDIOLOGY | Facility: CLINIC | Age: 87
End: 2022-08-24

## 2022-09-19 ENCOUNTER — APPOINTMENT (OUTPATIENT)
Dept: CARDIOLOGY | Facility: CLINIC | Age: 87
End: 2022-09-19

## 2022-09-21 ENCOUNTER — APPOINTMENT (OUTPATIENT)
Dept: CARDIOLOGY | Facility: CLINIC | Age: 87
End: 2022-09-21

## 2022-09-21 ENCOUNTER — FORM ENCOUNTER (OUTPATIENT)
Age: 87
End: 2022-09-21

## 2022-10-12 ENCOUNTER — APPOINTMENT (OUTPATIENT)
Dept: CARDIOLOGY | Facility: CLINIC | Age: 87
End: 2022-10-12

## 2022-10-12 ENCOUNTER — NON-APPOINTMENT (OUTPATIENT)
Age: 87
End: 2022-10-12

## 2022-10-12 PROCEDURE — 93294 REM INTERROG EVL PM/LDLS PM: CPT

## 2022-10-12 PROCEDURE — 93296 REM INTERROG EVL PM/IDS: CPT

## 2023-01-01 ENCOUNTER — NON-APPOINTMENT (OUTPATIENT)
Age: 88
End: 2023-01-01

## 2023-01-01 ENCOUNTER — APPOINTMENT (OUTPATIENT)
Dept: ELECTROPHYSIOLOGY | Facility: CLINIC | Age: 88
End: 2023-01-01
Payer: MEDICARE

## 2023-01-01 ENCOUNTER — APPOINTMENT (OUTPATIENT)
Dept: CARDIOLOGY | Facility: CLINIC | Age: 88
End: 2023-01-01
Payer: MEDICARE

## 2023-01-01 VITALS
HEIGHT: 60 IN | HEART RATE: 60 BPM | SYSTOLIC BLOOD PRESSURE: 118 MMHG | BODY MASS INDEX: 25.52 KG/M2 | WEIGHT: 130 LBS | DIASTOLIC BLOOD PRESSURE: 70 MMHG

## 2023-01-01 VITALS
RESPIRATION RATE: 16 BRPM | TEMPERATURE: 97.1 F | BODY MASS INDEX: 26.7 KG/M2 | WEIGHT: 136 LBS | SYSTOLIC BLOOD PRESSURE: 118 MMHG | DIASTOLIC BLOOD PRESSURE: 66 MMHG | HEIGHT: 60 IN | HEART RATE: 62 BPM

## 2023-01-01 LAB
ALBUMIN SERPL ELPH-MCNC: 4.9 G/DL
ALBUMIN SERPL ELPH-MCNC: 5 G/DL
ALP BLD-CCNC: 84 U/L
ALP BLD-CCNC: 85 U/L
ALT SERPL-CCNC: 10 U/L
ALT SERPL-CCNC: 11 U/L
ANION GAP SERPL CALC-SCNC: 14 MMOL/L
ANION GAP SERPL CALC-SCNC: 15 MMOL/L
AST SERPL-CCNC: 21 U/L
AST SERPL-CCNC: 22 U/L
BILIRUB SERPL-MCNC: 0.4 MG/DL
BILIRUB SERPL-MCNC: 0.6 MG/DL
BUN SERPL-MCNC: 36 MG/DL
BUN SERPL-MCNC: 39 MG/DL
CALCIUM SERPL-MCNC: 9.6 MG/DL
CALCIUM SERPL-MCNC: 9.7 MG/DL
CHLORIDE SERPL-SCNC: 105 MMOL/L
CHLORIDE SERPL-SCNC: 107 MMOL/L
CO2 SERPL-SCNC: 20 MMOL/L
CO2 SERPL-SCNC: 20 MMOL/L
CREAT SERPL-MCNC: 1.5 MG/DL
CREAT SERPL-MCNC: 1.7 MG/DL
EGFR: 28 ML/MIN/1.73M2
EGFR: 33 ML/MIN/1.73M2
GLUCOSE SERPL-MCNC: 111 MG/DL
GLUCOSE SERPL-MCNC: 123 MG/DL
POTASSIUM SERPL-SCNC: 5.1 MMOL/L
POTASSIUM SERPL-SCNC: 5.3 MMOL/L
PROT SERPL-MCNC: 7.7 G/DL
PROT SERPL-MCNC: 7.8 G/DL
SODIUM SERPL-SCNC: 140 MMOL/L
SODIUM SERPL-SCNC: 141 MMOL/L
T4 FREE SERPL-MCNC: 1.6 NG/DL
TSH SERPL-ACNC: 2.65 UIU/ML

## 2023-01-01 PROCEDURE — 93296 REM INTERROG EVL PM/IDS: CPT

## 2023-01-01 PROCEDURE — 99214 OFFICE O/P EST MOD 30 MIN: CPT | Mod: 25

## 2023-01-01 PROCEDURE — 99215 OFFICE O/P EST HI 40 MIN: CPT | Mod: 25

## 2023-01-01 PROCEDURE — 93294 REM INTERROG EVL PM/LDLS PM: CPT

## 2023-01-01 PROCEDURE — 99213 OFFICE O/P EST LOW 20 MIN: CPT | Mod: 25

## 2023-01-01 PROCEDURE — 93280 PM DEVICE PROGR EVAL DUAL: CPT

## 2023-01-01 PROCEDURE — 93000 ELECTROCARDIOGRAM COMPLETE: CPT | Mod: 59

## 2023-01-01 RX ORDER — RIVAROXABAN 10 MG/1
10 TABLET, FILM COATED ORAL
Qty: 30 | Refills: 3 | Status: DISCONTINUED | COMMUNITY
End: 2023-01-01

## 2023-01-01 RX ORDER — VORTIOXETINE 20 MG/1
20 TABLET, FILM COATED ORAL
Refills: 0 | Status: DISCONTINUED | COMMUNITY
End: 2023-01-01

## 2023-01-11 ENCOUNTER — APPOINTMENT (OUTPATIENT)
Dept: CARDIOLOGY | Facility: CLINIC | Age: 88
End: 2023-01-11

## 2023-01-11 ENCOUNTER — FORM ENCOUNTER (OUTPATIENT)
Age: 88
End: 2023-01-11

## 2023-02-09 ENCOUNTER — APPOINTMENT (OUTPATIENT)
Dept: CARDIOLOGY | Facility: CLINIC | Age: 88
End: 2023-02-09
Payer: MEDICARE

## 2023-02-09 ENCOUNTER — NON-APPOINTMENT (OUTPATIENT)
Age: 88
End: 2023-02-09

## 2023-02-09 PROCEDURE — 93294 REM INTERROG EVL PM/LDLS PM: CPT

## 2023-02-09 PROCEDURE — 93296 REM INTERROG EVL PM/IDS: CPT

## 2023-06-28 NOTE — DISCUSSION/SUMMARY
[Pacemaker Function Normal] : normal pacemaker function [FreeTextEntry1] : Patient was educated on modifiable bleeding risk factors such as correct dosage, frequency, adherence, to avoid concomitant use of over-the -counter nonsteroidal anti-inflammatory medications, excessive alcohol intake, and compliance with antihypertensive meds to control blood pressure.\par \par Potential complications associated with OAC such as bleeding , signs and symptoms, when to call office and when to seek immediate medical attention/ ER visit discussed in great details. \par \par Fall prevention, to avoid hazardous activities discussed.

## 2023-06-28 NOTE — HISTORY OF PRESENT ILLNESS
[de-identified] : \par PCP: Dr. Héctor Quevedo\par \par 89 yo Cantonese speaking F with history of HTN, SSS s/p DC PPM implanted by Dr. Frias in 2018 and atrial fibrillation here for routine pacemaker evaluation. She was admitted 5/6-5/11/2018 for dizziness. \par \par Daughter states her mother had a blood clot in her left leg, for which she is on Xarelto. This was ~2019. \par \par She presents for routine follow up visit. Has not been to the office in almost 2 years due to the pandemic. Xarelto has been discontinued by PCP for blood in her stools Did not have GI work up and did not require transfusions. She complains of dizziness and tightness in her head around her eye. She denies chest pain, dyspnea, palpitations, presyncope or syncope.

## 2023-06-28 NOTE — ASSESSMENT
[FreeTextEntry1] : # SSS s/p DC PPM (2018)\par - Normal functioning PPM \par - Remote monitor is set up and patient is transmitting.\par \par # Paroxysmal AFib\par - None seen on interrogate all. Carelink reviewed and paroxysmal AF seen in Sept 2019 (~ 1 hr). \par - Patient is also on Amio 100 mg daily. Xarelto d/c'ed by PCP due to blood in her stools. \par - Asked pt to follow up with ophtho and pulm. Pt is not interested in following up with either.\par - 2D echo prev ordered, but pt refused\par - Patient and daughter understand she is at risk for CVA without OAC. They would like to resume OAC. Will prescribe Eliquis 2.5 mg PO BID for CHADS VASc at least 4 (HTN, Age > 75, F). Discussed with patient and daughter the possibility of LUDIN occlusion devices if she cannot tolerate OAC. Obtain baseline labs today. \par \par # H/o DVT?\par - Xarelto discontinued by PCP due to blood in her stool\par \par # HTN\par - BP well controlled\par - Cont Amlodipine 5 mg daily and Telmisartan 40 mg daily\par - 2g Na diet enforced\par - Follow up with PCP\par \par I have also advised the patient to go to the nearest emergency room if she experiences any chest pain, dyspnea, syncope, or has any other compelling symptoms. \par \par Follow up in 2 weeks with NP for repeat bloodwork after initiating OAC\par Follow up in 6 mo

## 2023-06-28 NOTE — REASON FOR VISIT
Current Events [___ Device Check] : is here today for a [unfilled] device check for [Other ___] : [unfilled] [Other: _____] : [unfilled] [[employer] ] : provided by [employer]   [Time Spent: ____ minutes] : Total time spent using  services: [unfilled] minutes. The patient's primary language is not English thus required  services. [Interpreters_IDNumber] : [id number] [Interpreters_FullName] : Koko [TWNoteComboBox1] : Aubree

## 2023-07-27 NOTE — REASON FOR VISIT
[___ Device Check] : is here today for a [unfilled] device check for [Other ___] : [unfilled] [Other: _____] : [unfilled] [[employer] ] : provided by [employer]   [Interpreters_IDNumber] : [id number] [Interpreters_FullName] : Koko [TWNoteComboBox1] : Aubree

## 2023-07-27 NOTE — HISTORY OF PRESENT ILLNESS
[de-identified] : \par PCP: Dr. Héctor Quevedo\par \par 89 yo Cantonese speaking F with history of HTN, SSS s/p DC PPM implanted by Dr. Frias in 2018 and atrial fibrillation here for routine pacemaker evaluation. She was admitted 5/6-5/11/2018 for dizziness. \par \par Daughter states her mother had a blood clot in her left leg, for which she is on Xarelto. This was ~2019. \par \par She presents for routine follow up visit. Has not been to the office in almost 2 years due to the pandemic. Xarelto has been discontinued by PCP for blood in her stools Did not have GI work up and did not require transfusions. She complains of dizziness and tightness in her head around her eye. She denies chest pain, dyspnea, palpitations, presyncope or syncope.

## 2023-07-27 NOTE — CARDIOLOGY SUMMARY
[de-identified] : 07/27/2023: A-paced, V-sensed, SR, 1st degree AV block   HR 62bpm\par 6/26/2023 A-paced, V-sensed

## 2023-07-27 NOTE — ASSESSMENT
[FreeTextEntry1] : \par Pt presents today for 1-mo f/u post AC initiation. She is accompanied by her daughter. Speaks Cantonese, used  for assistance. Today the patient is feeling generally well with no acute complaints. They deny CP, SOB, palpitations, dizziness, syncope. Pt reports LLE pain/discomfort. This is due to old L-hip fx -pts daughter states their PCP is aware and sx not doable because of pt's age. Pt uses lidocaine patches for pain along with ice/heat. Daughter knows to monitor LE for swelling, redness, increase in pain.\par \par # SSS s/p DC PPM (2018)\par - Device interrogated and reprogrammed as described in procedure. Device function normal. All parameters stable. No events. See Device Printout\par - Remote monitor is set up and patient is transmitting.\par \par # Paroxysmal AFib\par - None seen on interrogate all. Carelink reviewed and paroxysmal AF seen in Sept 2019 (~ 1 hr). \par - Patient is also on Amio 100 mg daily. Xarelto d/c'ed by PCP due to blood in her stools. \par - Follow up with ophtho and pulm. Pt is not interested in following up with either.\par - 2D echo prev ordered, but pt refused\par - Patient and daughter understand she is at risk for CVA without OAC. They would like to resume OAC. Will prescribe Eliquis 2.5 mg PO BID for CHADS VASc at least 4 (HTN, Age > 75, F). Discussed with patient and daughter the possibility of LUDIN occlusion devices if she cannot tolerate OAC.\par - Labs ordered today CBC, CMP \par \par # H/o DVT?\par - Xarelto discontinued by PCP due to blood in her stool\par \par # HTN\par - BP well controlled\par - Cont Amlodipine 5 mg daily and Telmisartan 40 mg daily\par - 2g Na diet enforced\par - Follow up with PCP\par \par I have also advised the patient to go to the nearest emergency room if she experiences any chest pain, dyspnea, syncope, or has any other compelling symptoms. \par \par Follow up as scheduled in 6 mo with Dr. Solis

## 2023-07-27 NOTE — REASON FOR VISIT
[___ Device Check] : is here today for a [unfilled] device check for [Other ___] : [unfilled] [Other: _____] : [unfilled] [[employer] ] : provided by [employer]   [Interpreters_IDNumber] : [id number] [Interpreters_FullName] : Koko [TWNoteComboBox1] : Aubree

## 2023-07-27 NOTE — CARDIOLOGY SUMMARY
[de-identified] : 07/27/2023: A-paced, V-sensed, SR, 1st degree AV block   HR 62bpm\par 6/26/2023 A-paced, V-sensed

## 2023-07-27 NOTE — PROCEDURE
[No] : not [Sinus Bradycardia] : sinus bradycardia [See Scanned Paceart Report] : See scanned paceart report [See Device Printout] : See device printout [Pacemaker] : pacemaker [Voltage: ___ volts] : Voltage was [unfilled] volts [Longevity: ___ months] : The estimated remaining battery life is [unfilled] months [Threshold Testing Performed] : Threshold testing was performed [Atrial] : Atrial [Ventricular] : Ventricular [Lead Imp:  ___ohms] : lead impedance was [unfilled] ohms [Sensing Amplitude ___mv] : sensing amplitude was [unfilled] mv [___V @] : [unfilled] V [___ ms] : [unfilled] ms [Programmed for Longevity] : output reprogrammed for improved battery longevity [Apace-Vpace ___ %] : Apace-Vpace [unfilled]% [de-identified] : [employer] [de-identified] : JOCELYNN [de-identified] : [id number] [de-identified] : 5/10/18 [de-identified] : ELIZABETH [de-identified] : 70 [de-identified] : Set max AV delay to 350ms to turn on MVP 2.0. [de-identified] : AP: 100% // : <0.1%\par No events. \par Flat histograms - but pt. denies shortness of breath when walking\par Transmitting on [facility]

## 2023-07-27 NOTE — HISTORY OF PRESENT ILLNESS
[de-identified] : \par PCP: Dr. Héctor Quevedo\par \par 89 yo Cantonese speaking F with history of HTN, SSS s/p DC PPM implanted by Dr. Frias in 2018 and atrial fibrillation here for routine pacemaker evaluation. She was admitted 5/6-5/11/2018 for dizziness. \par \par Daughter states her mother had a blood clot in her left leg, for which she is on Xarelto. This was ~2019. \par \par She presents for routine follow up visit. Has not been to the office in almost 2 years due to the pandemic. Xarelto has been discontinued by PCP for blood in her stools Did not have GI work up and did not require transfusions. She complains of dizziness and tightness in her head around her eye. She denies chest pain, dyspnea, palpitations, presyncope or syncope.

## 2023-07-27 NOTE — PHYSICAL EXAM
[General Appearance - Well Developed] : well developed [Normal Appearance] : normal appearance [Well Groomed] : well groomed [General Appearance - Well Nourished] : well nourished [No Deformities] : no deformities [General Appearance - In No Acute Distress] : no acute distress [Heart Rate And Rhythm] : heart rate and rhythm were normal [Heart Sounds] : normal S1 and S2 [Murmurs] : no murmurs present [Edema] : no peripheral edema present [Respiration, Rhythm And Depth] : normal respiratory rhythm and effort [Exaggerated Use Of Accessory Muscles For Inspiration] : no accessory muscle use [Auscultation Breath Sounds / Voice Sounds] : lungs were clear to auscultation bilaterally [Left Infraclavicular] : left infraclavicular area [Clean] : clean [Dry] : dry [Well-Healed] : well-healed [Abdomen Soft] : soft [Nail Clubbing] : no clubbing of the fingernails [Cyanosis, Localized] : no localized cyanosis [Petechial Hemorrhages (___cm)] : no petechial hemorrhages [] : no ischemic changes

## 2023-07-27 NOTE — DISCUSSION/SUMMARY
[FreeTextEntry1] : Patient was educated on modifiable bleeding risk factors such as correct dosage, frequency, adherence, to avoid concomitant use of over-the -counter nonsteroidal anti-inflammatory medications, excessive alcohol intake, and compliance with antihypertensive meds to control blood pressure.\par \par Potential complications associated with OAC such as bleeding , signs and symptoms, when to call office and when to seek immediate medical attention/ ER visit discussed in great details. \par \par Fall prevention, to avoid hazardous activities discussed.

## 2023-07-27 NOTE — PROCEDURE
[No] : not [Sinus Bradycardia] : sinus bradycardia [See Scanned Paceart Report] : See scanned paceart report [See Device Printout] : See device printout [Pacemaker] : pacemaker [Voltage: ___ volts] : Voltage was [unfilled] volts [Longevity: ___ months] : The estimated remaining battery life is [unfilled] months [Threshold Testing Performed] : Threshold testing was performed [Atrial] : Atrial [Ventricular] : Ventricular [Lead Imp:  ___ohms] : lead impedance was [unfilled] ohms [Sensing Amplitude ___mv] : sensing amplitude was [unfilled] mv [___V @] : [unfilled] V [___ ms] : [unfilled] ms [Programmed for Longevity] : output reprogrammed for improved battery longevity [Apace-Vpace ___ %] : Apace-Vpace [unfilled]% [de-identified] : [employer] [de-identified] : JOCELYNN [de-identified] : [id number] [de-identified] : 5/10/18 [de-identified] : ELIZABETH [de-identified] : 70 [de-identified] : Set max AV delay to 350ms to turn on MVP 2.0. [de-identified] : AP: 100% // : <0.1%\par No events. \par Flat histograms - but pt. denies shortness of breath when walking\par Transmitting on [facility]

## 2023-12-19 NOTE — PATIENT PROFILE ADULT. - CAREGIVER
I called and spoke to patient, letting her know that referral has been placed. [facility]    Declines

## 2024-01-01 ENCOUNTER — RESULT REVIEW (OUTPATIENT)
Age: 89
End: 2024-01-01

## 2024-01-01 ENCOUNTER — INPATIENT (INPATIENT)
Facility: HOSPITAL | Age: 89
LOS: 2 days | Discharge: HOME | DRG: 149 | End: 2024-03-28
Attending: PSYCHIATRY & NEUROLOGY | Admitting: PSYCHIATRY & NEUROLOGY
Payer: MEDICARE

## 2024-01-01 ENCOUNTER — TRANSCRIPTION ENCOUNTER (OUTPATIENT)
Age: 89
End: 2024-01-01

## 2024-01-01 ENCOUNTER — APPOINTMENT (OUTPATIENT)
Dept: ELECTROPHYSIOLOGY | Facility: CLINIC | Age: 89
End: 2024-01-01
Payer: MEDICARE

## 2024-01-01 ENCOUNTER — INPATIENT (INPATIENT)
Facility: HOSPITAL | Age: 89
LOS: 0 days | DRG: 65 | End: 2024-04-26
Attending: NEUROLOGICAL SURGERY | Admitting: NEUROLOGICAL SURGERY
Payer: MEDICARE

## 2024-01-01 VITALS
OXYGEN SATURATION: 97 % | TEMPERATURE: 96 F | RESPIRATION RATE: 18 BRPM | HEART RATE: 83 BPM | HEIGHT: 66 IN | WEIGHT: 136.03 LBS | DIASTOLIC BLOOD PRESSURE: 65 MMHG | SYSTOLIC BLOOD PRESSURE: 139 MMHG

## 2024-01-01 VITALS — WEIGHT: 176.37 LBS | HEIGHT: 66 IN

## 2024-01-01 VITALS
TEMPERATURE: 98 F | SYSTOLIC BLOOD PRESSURE: 97 MMHG | OXYGEN SATURATION: 97 % | DIASTOLIC BLOOD PRESSURE: 56 MMHG | HEART RATE: 66 BPM | RESPIRATION RATE: 18 BRPM

## 2024-01-01 VITALS
BODY MASS INDEX: 26.7 KG/M2 | TEMPERATURE: 98 F | WEIGHT: 136 LBS | DIASTOLIC BLOOD PRESSURE: 58 MMHG | HEART RATE: 67 BPM | HEIGHT: 60 IN | SYSTOLIC BLOOD PRESSURE: 120 MMHG

## 2024-01-01 DIAGNOSIS — I10 ESSENTIAL (PRIMARY) HYPERTENSION: ICD-10-CM

## 2024-01-01 DIAGNOSIS — Z95.0 PRESENCE OF CARDIAC PACEMAKER: ICD-10-CM

## 2024-01-01 DIAGNOSIS — R94.31 ABNORMAL ELECTROCARDIOGRAM [ECG] [EKG]: ICD-10-CM

## 2024-01-01 DIAGNOSIS — Z79.01 LONG TERM (CURRENT) USE OF ANTICOAGULANTS: ICD-10-CM

## 2024-01-01 DIAGNOSIS — I46.9 CARDIAC ARREST, CAUSE UNSPECIFIED: ICD-10-CM

## 2024-01-01 DIAGNOSIS — I48.0 PAROXYSMAL ATRIAL FIBRILLATION: ICD-10-CM

## 2024-01-01 DIAGNOSIS — G89.29 OTHER CHRONIC PAIN: ICD-10-CM

## 2024-01-01 DIAGNOSIS — H26.9 UNSPECIFIED CATARACT: ICD-10-CM

## 2024-01-01 DIAGNOSIS — Z95.0 PRESENCE OF CARDIAC PACEMAKER: Chronic | ICD-10-CM

## 2024-01-01 DIAGNOSIS — M25.562 PAIN IN LEFT KNEE: ICD-10-CM

## 2024-01-01 DIAGNOSIS — I48.20 CHRONIC ATRIAL FIBRILLATION, UNSPECIFIED: ICD-10-CM

## 2024-01-01 DIAGNOSIS — I67.1 CEREBRAL ANEURYSM, NONRUPTURED: ICD-10-CM

## 2024-01-01 DIAGNOSIS — R42 DIZZINESS AND GIDDINESS: ICD-10-CM

## 2024-01-01 DIAGNOSIS — Z45.018 ENCOUNTER FOR ADJUSTMENT AND MANAGEMENT OF OTHER PART OF CARDIAC PACEMAKER: ICD-10-CM

## 2024-01-01 DIAGNOSIS — R79.89 OTHER SPECIFIED ABNORMAL FINDINGS OF BLOOD CHEMISTRY: ICD-10-CM

## 2024-01-01 DIAGNOSIS — R51.9 HEADACHE, UNSPECIFIED: ICD-10-CM

## 2024-01-01 DIAGNOSIS — I49.5 SICK SINUS SYNDROME: ICD-10-CM

## 2024-01-01 DIAGNOSIS — E78.5 HYPERLIPIDEMIA, UNSPECIFIED: ICD-10-CM

## 2024-01-01 LAB
ACANTHOCYTES BLD QL SMEAR: SLIGHT — SIGNIFICANT CHANGE UP
ALBUMIN SERPL ELPH-MCNC: 3.5 G/DL — SIGNIFICANT CHANGE UP (ref 3.5–5.2)
ALBUMIN SERPL ELPH-MCNC: 4.4 G/DL — SIGNIFICANT CHANGE UP (ref 3.5–5.2)
ALP SERPL-CCNC: 60 U/L — SIGNIFICANT CHANGE UP (ref 30–115)
ALP SERPL-CCNC: 81 U/L — SIGNIFICANT CHANGE UP (ref 30–115)
ALT FLD-CCNC: 11 U/L — SIGNIFICANT CHANGE UP (ref 0–41)
ALT FLD-CCNC: 390 U/L — HIGH (ref 0–41)
ANION GAP SERPL CALC-SCNC: 13 MMOL/L — SIGNIFICANT CHANGE UP (ref 7–14)
ANION GAP SERPL CALC-SCNC: 21 MMOL/L — HIGH (ref 7–14)
APTT BLD: 30.1 SEC — SIGNIFICANT CHANGE UP (ref 27–39.2)
APTT BLD: 35.9 SEC — SIGNIFICANT CHANGE UP (ref 27–39.2)
AST SERPL-CCNC: 23 U/L — SIGNIFICANT CHANGE UP (ref 0–41)
AST SERPL-CCNC: 394 U/L — HIGH (ref 0–41)
BASE EXCESS BLDV CALC-SCNC: -22.9 MMOL/L — LOW (ref -2–3)
BASOPHILS # BLD AUTO: 0.03 K/UL — SIGNIFICANT CHANGE UP (ref 0–0.2)
BASOPHILS # BLD AUTO: 0.06 K/UL — SIGNIFICANT CHANGE UP (ref 0–0.2)
BASOPHILS NFR BLD AUTO: 0.6 % — SIGNIFICANT CHANGE UP (ref 0–1)
BASOPHILS NFR BLD AUTO: 0.9 % — SIGNIFICANT CHANGE UP (ref 0–1)
BILIRUB SERPL-MCNC: 0.3 MG/DL — SIGNIFICANT CHANGE UP (ref 0.2–1.2)
BILIRUB SERPL-MCNC: 0.7 MG/DL — SIGNIFICANT CHANGE UP (ref 0.2–1.2)
BUN SERPL-MCNC: 27 MG/DL — HIGH (ref 10–20)
BUN SERPL-MCNC: 35 MG/DL — HIGH (ref 10–20)
CA-I SERPL-SCNC: 2.55 MMOL/L — CRITICAL HIGH (ref 1.15–1.33)
CALCIUM SERPL-MCNC: 19.8 MG/DL — CRITICAL HIGH (ref 8.4–10.5)
CALCIUM SERPL-MCNC: 9.2 MG/DL — SIGNIFICANT CHANGE UP (ref 8.4–10.4)
CHLORIDE SERPL-SCNC: 108 MMOL/L — SIGNIFICANT CHANGE UP (ref 98–110)
CHLORIDE SERPL-SCNC: 108 MMOL/L — SIGNIFICANT CHANGE UP (ref 98–110)
CO2 SERPL-SCNC: 21 MMOL/L — SIGNIFICANT CHANGE UP (ref 17–32)
CO2 SERPL-SCNC: 9 MMOL/L — CRITICAL LOW (ref 17–32)
CREAT SERPL-MCNC: 1.3 MG/DL — SIGNIFICANT CHANGE UP (ref 0.7–1.5)
CREAT SERPL-MCNC: 2.1 MG/DL — HIGH (ref 0.7–1.5)
DACRYOCYTES BLD QL SMEAR: SLIGHT — SIGNIFICANT CHANGE UP
EGFR: 22 ML/MIN/1.73M2 — LOW
EGFR: 39 ML/MIN/1.73M2 — LOW
EOSINOPHIL # BLD AUTO: 0.1 K/UL — SIGNIFICANT CHANGE UP (ref 0–0.7)
EOSINOPHIL # BLD AUTO: 0.12 K/UL — SIGNIFICANT CHANGE UP (ref 0–0.7)
EOSINOPHIL NFR BLD AUTO: 1.8 % — SIGNIFICANT CHANGE UP (ref 0–8)
EOSINOPHIL NFR BLD AUTO: 2 % — SIGNIFICANT CHANGE UP (ref 0–8)
GAS PNL BLDA: SIGNIFICANT CHANGE UP
GAS PNL BLDA: SIGNIFICANT CHANGE UP
GAS PNL BLDV: 134 MMOL/L — LOW (ref 136–145)
GAS PNL BLDV: SIGNIFICANT CHANGE UP
GAS PNL BLDV: SIGNIFICANT CHANGE UP
GIANT PLATELETS BLD QL SMEAR: PRESENT — SIGNIFICANT CHANGE UP
GLUCOSE SERPL-MCNC: 403 MG/DL — HIGH (ref 70–99)
GLUCOSE SERPL-MCNC: 93 MG/DL — SIGNIFICANT CHANGE UP (ref 70–99)
HCO3 BLDV-SCNC: 11 MMOL/L — LOW (ref 22–29)
HCT VFR BLD CALC: 33.2 % — LOW (ref 37–47)
HCT VFR BLD CALC: 36.2 % — LOW (ref 37–47)
HCT VFR BLDA CALC: 31 % — LOW (ref 34.5–46.5)
HGB BLD CALC-MCNC: 10.4 G/DL — LOW (ref 11.7–16.1)
HGB BLD-MCNC: 10 G/DL — LOW (ref 12–16)
HGB BLD-MCNC: 12 G/DL — SIGNIFICANT CHANGE UP (ref 12–16)
IMM GRANULOCYTES NFR BLD AUTO: 0.4 % — HIGH (ref 0.1–0.3)
INR BLD: 1.25 RATIO — SIGNIFICANT CHANGE UP (ref 0.65–1.3)
INR BLD: 1.45 RATIO — HIGH (ref 0.65–1.3)
LACTATE BLDV-MCNC: 11.8 MMOL/L — CRITICAL HIGH (ref 0.5–2)
LACTATE SERPL-SCNC: 13.4 MMOL/L — CRITICAL HIGH (ref 0.7–2)
LYMPHOCYTES # BLD AUTO: 0.95 K/UL — LOW (ref 1.2–3.4)
LYMPHOCYTES # BLD AUTO: 18.6 % — LOW (ref 20.5–51.1)
LYMPHOCYTES # BLD AUTO: 2.85 K/UL — SIGNIFICANT CHANGE UP (ref 1.2–3.4)
LYMPHOCYTES # BLD AUTO: 44.5 % — SIGNIFICANT CHANGE UP (ref 20.5–51.1)
MAGNESIUM SERPL-MCNC: 3.2 MG/DL — CRITICAL HIGH (ref 1.8–2.4)
MANUAL SMEAR VERIFICATION: SIGNIFICANT CHANGE UP
MCHC RBC-ENTMCNC: 27.8 PG — SIGNIFICANT CHANGE UP (ref 27–31)
MCHC RBC-ENTMCNC: 28.3 PG — SIGNIFICANT CHANGE UP (ref 27–31)
MCHC RBC-ENTMCNC: 30.1 G/DL — LOW (ref 32–37)
MCHC RBC-ENTMCNC: 33.1 G/DL — SIGNIFICANT CHANGE UP (ref 32–37)
MCV RBC AUTO: 83.8 FL — SIGNIFICANT CHANGE UP (ref 81–99)
MCV RBC AUTO: 94.1 FL — SIGNIFICANT CHANGE UP (ref 81–99)
METAMYELOCYTES # FLD: 2.7 % — HIGH (ref 0–0)
MONOCYTES # BLD AUTO: 0.47 K/UL — SIGNIFICANT CHANGE UP (ref 0.1–0.6)
MONOCYTES # BLD AUTO: 0.54 K/UL — SIGNIFICANT CHANGE UP (ref 0.1–0.6)
MONOCYTES NFR BLD AUTO: 10.6 % — HIGH (ref 1.7–9.3)
MONOCYTES NFR BLD AUTO: 7.3 % — SIGNIFICANT CHANGE UP (ref 1.7–9.3)
MYELOCYTES NFR BLD: 8.2 % — HIGH (ref 0–0)
NEUTROPHILS # BLD AUTO: 1.75 K/UL — SIGNIFICANT CHANGE UP (ref 1.4–6.5)
NEUTROPHILS # BLD AUTO: 3.47 K/UL — SIGNIFICANT CHANGE UP (ref 1.4–6.5)
NEUTROPHILS NFR BLD AUTO: 21.8 % — LOW (ref 42.2–75.2)
NEUTROPHILS NFR BLD AUTO: 67.8 % — SIGNIFICANT CHANGE UP (ref 42.2–75.2)
NEUTS BAND # BLD: 5.5 % — SIGNIFICANT CHANGE UP (ref 0–6)
NRBC # BLD: 0 /100 WBCS — SIGNIFICANT CHANGE UP (ref 0–0)
NT-PROBNP SERPL-SCNC: 554 PG/ML — HIGH (ref 0–300)
OVALOCYTES BLD QL SMEAR: SLIGHT — SIGNIFICANT CHANGE UP
PCO2 BLDV: 62 MMHG — HIGH (ref 39–42)
PH BLDV: <6.9 — CRITICAL LOW (ref 7.32–7.43)
PHOSPHATE SERPL-MCNC: 11.2 MG/DL — HIGH (ref 2.1–4.9)
PLAT MORPH BLD: NORMAL — SIGNIFICANT CHANGE UP
PLATELET # BLD AUTO: 170 K/UL — SIGNIFICANT CHANGE UP (ref 130–400)
PLATELET # BLD AUTO: 210 K/UL — SIGNIFICANT CHANGE UP (ref 130–400)
PMV BLD: 10.5 FL — HIGH (ref 7.4–10.4)
PMV BLD: 10.6 FL — HIGH (ref 7.4–10.4)
PO2 BLDV: 65 MMHG — HIGH (ref 25–45)
POIKILOCYTOSIS BLD QL AUTO: SIGNIFICANT CHANGE UP
POTASSIUM BLDV-SCNC: 4.2 MMOL/L — SIGNIFICANT CHANGE UP (ref 3.5–5.1)
POTASSIUM SERPL-MCNC: 4.4 MMOL/L — SIGNIFICANT CHANGE UP (ref 3.5–5)
POTASSIUM SERPL-MCNC: 4.8 MMOL/L — SIGNIFICANT CHANGE UP (ref 3.5–5)
POTASSIUM SERPL-SCNC: 4.4 MMOL/L — SIGNIFICANT CHANGE UP (ref 3.5–5)
POTASSIUM SERPL-SCNC: 4.8 MMOL/L — SIGNIFICANT CHANGE UP (ref 3.5–5)
PROT SERPL-MCNC: 5.7 G/DL — LOW (ref 6–8)
PROT SERPL-MCNC: 7.2 G/DL — SIGNIFICANT CHANGE UP (ref 6–8)
PROTHROM AB SERPL-ACNC: 14.3 SEC — HIGH (ref 9.95–12.87)
PROTHROM AB SERPL-ACNC: 16.6 SEC — HIGH (ref 9.95–12.87)
RBC # BLD: 3.53 M/UL — LOW (ref 4.2–5.4)
RBC # BLD: 4.32 M/UL — SIGNIFICANT CHANGE UP (ref 4.2–5.4)
RBC # FLD: 14.7 % — HIGH (ref 11.5–14.5)
RBC # FLD: 14.9 % — HIGH (ref 11.5–14.5)
RBC BLD AUTO: ABNORMAL
SAO2 % BLDV: 78.4 % — SIGNIFICANT CHANGE UP (ref 67–88)
SMUDGE CELLS # BLD: PRESENT — SIGNIFICANT CHANGE UP
SODIUM SERPL-SCNC: 138 MMOL/L — SIGNIFICANT CHANGE UP (ref 135–146)
SODIUM SERPL-SCNC: 142 MMOL/L — SIGNIFICANT CHANGE UP (ref 135–146)
TROPONIN T, HIGH SENSITIVITY RESULT: 211 NG/L — CRITICAL HIGH (ref 6–13)
TROPONIN T, HIGH SENSITIVITY RESULT: 26 NG/L — HIGH (ref 6–13)
TROPONIN T, HIGH SENSITIVITY RESULT: 28 NG/L — HIGH (ref 6–13)
TROPONIN T, HIGH SENSITIVITY RESULT: 29 NG/L — HIGH (ref 6–13)
VARIANT LYMPHS # BLD: 7.3 % — HIGH (ref 0–5)
WBC # BLD: 5.11 K/UL — SIGNIFICANT CHANGE UP (ref 4.8–10.8)
WBC # BLD: 6.41 K/UL — SIGNIFICANT CHANGE UP (ref 4.8–10.8)
WBC # FLD AUTO: 5.11 K/UL — SIGNIFICANT CHANGE UP (ref 4.8–10.8)
WBC # FLD AUTO: 6.41 K/UL — SIGNIFICANT CHANGE UP (ref 4.8–10.8)

## 2024-01-01 PROCEDURE — 93000 ELECTROCARDIOGRAM COMPLETE: CPT | Mod: 59

## 2024-01-01 PROCEDURE — 97161 PT EVAL LOW COMPLEX 20 MIN: CPT | Mod: GP

## 2024-01-01 PROCEDURE — 93010 ELECTROCARDIOGRAM REPORT: CPT

## 2024-01-01 PROCEDURE — 71045 X-RAY EXAM CHEST 1 VIEW: CPT | Mod: 26

## 2024-01-01 PROCEDURE — 99223 1ST HOSP IP/OBS HIGH 75: CPT

## 2024-01-01 PROCEDURE — 70551 MRI BRAIN STEM W/O DYE: CPT | Mod: MC

## 2024-01-01 PROCEDURE — 99291 CRITICAL CARE FIRST HOUR: CPT | Mod: 25

## 2024-01-01 PROCEDURE — 84484 ASSAY OF TROPONIN QUANT: CPT

## 2024-01-01 PROCEDURE — 99239 HOSP IP/OBS DSCHRG MGMT >30: CPT

## 2024-01-01 PROCEDURE — 99215 OFFICE O/P EST HI 40 MIN: CPT | Mod: 25

## 2024-01-01 PROCEDURE — 70496 CT ANGIOGRAPHY HEAD: CPT | Mod: 26,MC

## 2024-01-01 PROCEDURE — 70450 CT HEAD/BRAIN W/O DYE: CPT | Mod: 26,MC,XU

## 2024-01-01 PROCEDURE — 70551 MRI BRAIN STEM W/O DYE: CPT | Mod: 26

## 2024-01-01 PROCEDURE — 93306 TTE W/DOPPLER COMPLETE: CPT | Mod: 26

## 2024-01-01 PROCEDURE — 70498 CT ANGIOGRAPHY NECK: CPT | Mod: 26,MC

## 2024-01-01 PROCEDURE — 93280 PM DEVICE PROGR EVAL DUAL: CPT

## 2024-01-01 PROCEDURE — 36415 COLL VENOUS BLD VENIPUNCTURE: CPT

## 2024-01-01 PROCEDURE — 99232 SBSQ HOSP IP/OBS MODERATE 35: CPT

## 2024-01-01 PROCEDURE — 99233 SBSQ HOSP IP/OBS HIGH 50: CPT

## 2024-01-01 PROCEDURE — 70450 CT HEAD/BRAIN W/O DYE: CPT | Mod: 26,MC

## 2024-01-01 PROCEDURE — 93306 TTE W/DOPPLER COMPLETE: CPT

## 2024-01-01 PROCEDURE — 99222 1ST HOSP IP/OBS MODERATE 55: CPT

## 2024-01-01 PROCEDURE — 36556 INSERT NON-TUNNEL CV CATH: CPT | Mod: RT

## 2024-01-01 RX ORDER — APIXABAN 2.5 MG/1
2.5 TABLET, FILM COATED ORAL
Qty: 60 | Refills: 11 | Status: ACTIVE | COMMUNITY
Start: 2023-01-01

## 2024-01-01 RX ORDER — AMIODARONE HYDROCHLORIDE 400 MG/1
100 TABLET ORAL DAILY
Refills: 0 | Status: DISCONTINUED | OUTPATIENT
Start: 2024-01-01 | End: 2024-01-01

## 2024-01-01 RX ORDER — SODIUM CHLORIDE 9 MG/ML
30 INJECTION INTRAMUSCULAR; INTRAVENOUS; SUBCUTANEOUS ONCE
Refills: 0 | Status: DISCONTINUED | OUTPATIENT
Start: 2024-01-01 | End: 2024-01-01

## 2024-01-01 RX ORDER — ESOMEPRAZOLE MAGNESIUM 40 MG/1
1 CAPSULE, DELAYED RELEASE ORAL
Qty: 0 | Refills: 0 | DISCHARGE

## 2024-01-01 RX ORDER — NOREPINEPHRINE BITARTRATE/D5W 8 MG/250ML
0.05 PLASTIC BAG, INJECTION (ML) INTRAVENOUS
Qty: 8 | Refills: 0 | Status: DISCONTINUED | OUTPATIENT
Start: 2024-01-01 | End: 2024-01-01

## 2024-01-01 RX ORDER — APIXABAN 2.5 MG/1
1 TABLET, FILM COATED ORAL
Qty: 60 | Refills: 1
Start: 2024-01-01 | End: 2024-05-26

## 2024-01-01 RX ORDER — APIXABAN 2.5 MG/1
2.5 TABLET, FILM COATED ORAL EVERY 12 HOURS
Refills: 0 | Status: DISCONTINUED | OUTPATIENT
Start: 2024-01-01 | End: 2024-01-01

## 2024-01-01 RX ORDER — MECLIZINE HCL 12.5 MG
25 TABLET ORAL EVERY 6 HOURS
Refills: 0 | Status: DISCONTINUED | OUTPATIENT
Start: 2024-01-01 | End: 2024-01-01

## 2024-01-01 RX ORDER — LOSARTAN POTASSIUM 100 MG/1
50 TABLET, FILM COATED ORAL DAILY
Refills: 0 | Status: DISCONTINUED | OUTPATIENT
Start: 2024-01-01 | End: 2024-01-01

## 2024-01-01 RX ORDER — AMLODIPINE BESYLATE 2.5 MG/1
5 TABLET ORAL AT BEDTIME
Refills: 0 | Status: DISCONTINUED | OUTPATIENT
Start: 2024-01-01 | End: 2024-01-01

## 2024-01-01 RX ORDER — AMLODIPINE BESYLATE 5 MG/1
5 TABLET ORAL DAILY
Qty: 90 | Refills: 3 | Status: ACTIVE | COMMUNITY

## 2024-01-01 RX ORDER — EPINEPHRINE 0.3 MG/.3ML
0.1 INJECTION INTRAMUSCULAR; SUBCUTANEOUS
Qty: 4 | Refills: 0 | Status: DISCONTINUED | OUTPATIENT
Start: 2024-01-01 | End: 2024-01-01

## 2024-01-01 RX ORDER — APIXABAN 2.5 MG/1
5 TABLET, FILM COATED ORAL
Refills: 0 | Status: DISCONTINUED | OUTPATIENT
Start: 2024-01-01 | End: 2024-01-01

## 2024-01-01 RX ORDER — FENTANYL CITRATE 50 UG/ML
0.5 INJECTION INTRAVENOUS
Qty: 2500 | Refills: 0 | Status: DISCONTINUED | OUTPATIENT
Start: 2024-01-01 | End: 2024-01-01

## 2024-01-01 RX ORDER — TELMISARTAN 20 MG/1
1 TABLET ORAL
Refills: 0 | DISCHARGE

## 2024-01-01 RX ORDER — VASOPRESSIN 20 [USP'U]/ML
0.02 INJECTION INTRAVENOUS
Qty: 40 | Refills: 0 | Status: DISCONTINUED | OUTPATIENT
Start: 2024-01-01 | End: 2024-01-01

## 2024-01-01 RX ORDER — DM/P-EPHED/ACETAMINOPH/DOXYLAM
LIQUID (ML) ORAL DAILY
Refills: 0 | Status: ACTIVE | COMMUNITY

## 2024-01-01 RX ORDER — MULTIVITAMIN,THERAPEUTIC
TABLET ORAL DAILY
Refills: 0 | Status: ACTIVE | COMMUNITY

## 2024-01-01 RX ORDER — TELMISARTAN 40 MG/1
40 TABLET ORAL DAILY
Refills: 0 | Status: ACTIVE | COMMUNITY

## 2024-01-01 RX ORDER — MAGNESIUM OXIDE 400 MG ORAL TABLET 241.3 MG
400 TABLET ORAL
Refills: 0 | Status: DISCONTINUED | OUTPATIENT
Start: 2024-01-01 | End: 2024-01-01

## 2024-01-01 RX ORDER — SODIUM CHLORIDE 9 MG/ML
30 INJECTION INTRAMUSCULAR; INTRAVENOUS; SUBCUTANEOUS ONCE
Refills: 0 | Status: COMPLETED | OUTPATIENT
Start: 2024-01-01 | End: 2024-01-01

## 2024-01-01 RX ORDER — EPINEPHRINE 0.3 MG/.3ML
0.03 INJECTION INTRAMUSCULAR; SUBCUTANEOUS
Qty: 8 | Refills: 0 | Status: DISCONTINUED | OUTPATIENT
Start: 2024-01-01 | End: 2024-01-01

## 2024-01-01 RX ORDER — DOCUSATE SODIUM 100 MG/1
100 CAPSULE, LIQUID FILLED ORAL
Refills: 0 | Status: COMPLETED | COMMUNITY
End: 2024-01-01

## 2024-01-01 RX ORDER — AMIODARONE HYDROCHLORIDE 200 MG/1
200 TABLET ORAL DAILY
Refills: 0 | Status: ACTIVE | COMMUNITY

## 2024-01-01 RX ORDER — DEXMEDETOMIDINE HYDROCHLORIDE IN 0.9% SODIUM CHLORIDE 4 UG/ML
0.2 INJECTION INTRAVENOUS
Qty: 400 | Refills: 0 | Status: DISCONTINUED | OUTPATIENT
Start: 2024-01-01 | End: 2024-01-01

## 2024-01-01 RX ORDER — CHLORHEXIDINE GLUCONATE 4 %
400 (240 MG) LIQUID (ML) TOPICAL TWICE DAILY
Refills: 0 | Status: ACTIVE | COMMUNITY

## 2024-01-01 RX ADMIN — APIXABAN 5 MILLIGRAM(S): 2.5 TABLET, FILM COATED ORAL at 05:34

## 2024-01-01 RX ADMIN — EPINEPHRINE 30 MICROGRAM(S)/KG/MIN: 0.3 INJECTION INTRAMUSCULAR; SUBCUTANEOUS at 21:06

## 2024-01-01 RX ADMIN — AMLODIPINE BESYLATE 5 MILLIGRAM(S): 2.5 TABLET ORAL at 21:58

## 2024-01-01 RX ADMIN — MAGNESIUM OXIDE 400 MG ORAL TABLET 400 MILLIGRAM(S): 241.3 TABLET ORAL at 09:27

## 2024-01-01 RX ADMIN — AMIODARONE HYDROCHLORIDE 100 MILLIGRAM(S): 400 TABLET ORAL at 06:24

## 2024-01-01 RX ADMIN — APIXABAN 5 MILLIGRAM(S): 2.5 TABLET, FILM COATED ORAL at 06:24

## 2024-01-01 RX ADMIN — SODIUM CHLORIDE 30 MILLILITER(S): 9 INJECTION INTRAMUSCULAR; INTRAVENOUS; SUBCUTANEOUS at 23:36

## 2024-01-01 RX ADMIN — APIXABAN 5 MILLIGRAM(S): 2.5 TABLET, FILM COATED ORAL at 19:24

## 2024-01-01 RX ADMIN — APIXABAN 2.5 MILLIGRAM(S): 2.5 TABLET, FILM COATED ORAL at 05:51

## 2024-01-01 RX ADMIN — AMIODARONE HYDROCHLORIDE 100 MILLIGRAM(S): 400 TABLET ORAL at 05:50

## 2024-01-01 RX ADMIN — LOSARTAN POTASSIUM 50 MILLIGRAM(S): 100 TABLET, FILM COATED ORAL at 05:34

## 2024-01-01 RX ADMIN — Medication 7.5 MICROGRAM(S)/KG/MIN: at 21:05

## 2024-01-01 RX ADMIN — FENTANYL CITRATE 4 MICROGRAM(S)/KG/HR: 50 INJECTION INTRAVENOUS at 21:05

## 2024-01-01 RX ADMIN — MAGNESIUM OXIDE 400 MG ORAL TABLET 400 MILLIGRAM(S): 241.3 TABLET ORAL at 08:47

## 2024-01-01 RX ADMIN — AMIODARONE HYDROCHLORIDE 100 MILLIGRAM(S): 400 TABLET ORAL at 05:33

## 2024-01-01 RX ADMIN — LOSARTAN POTASSIUM 50 MILLIGRAM(S): 100 TABLET, FILM COATED ORAL at 05:50

## 2024-01-01 RX ADMIN — LOSARTAN POTASSIUM 50 MILLIGRAM(S): 100 TABLET, FILM COATED ORAL at 06:24

## 2024-01-01 RX ADMIN — APIXABAN 5 MILLIGRAM(S): 2.5 TABLET, FILM COATED ORAL at 17:15

## 2024-01-01 RX ADMIN — AMLODIPINE BESYLATE 5 MILLIGRAM(S): 2.5 TABLET ORAL at 21:41

## 2024-01-01 RX ADMIN — MAGNESIUM OXIDE 400 MG ORAL TABLET 400 MILLIGRAM(S): 241.3 TABLET ORAL at 17:14

## 2024-03-25 PROBLEM — I49.5 SINUS NODE DYSFUNCTION: Status: ACTIVE | Noted: 2021-09-20

## 2024-03-25 PROBLEM — I48.0 PAROXYSMAL ATRIAL FIBRILLATION: Status: ACTIVE | Noted: 2021-09-20

## 2024-03-25 PROBLEM — Z45.018 ENCOUNTER FOR INTERROGATION OF CARDIAC PACEMAKER: Status: ACTIVE | Noted: 2021-09-20

## 2024-03-25 PROBLEM — I10 HTN (HYPERTENSION): Status: ACTIVE | Noted: 2020-07-24

## 2024-03-25 NOTE — ED PROVIDER NOTE - ATTENDING APP SHARED VISIT CONTRIBUTION OF CARE
I have reviewed and agree with the mid-level note, except as documented in my note below.    89-year-old female history of HTN, A-fib on Eliquis, PPM, now presents with forehead "tightness "for approximately 1 week, denies fever, headache, palpitations, irregular heart-beat, chest pain / pressure, near or total loss of consciousness, abnormal speech, paresthesias, clumsiness, difficulty with coordination, focal weakness or neurological deficits, recent trauma or other associated complaints at present. No old chart available for review. I have reviewed and agree with the initial nursing note, except as documented in my note.    VSS, awake, alert, non-toxic appearing, PERRL / EOMI, no nystagmus, oropharynx clear, mmm, no JVD or carotid bruit, no skin rash or lesions, chest CTAB, non-labored breathing, no w/r/r, +S1/S2, RRR, no m/r/g, abdomen soft, NT, ND, +BS, no peripheral edema or deformities, equal pulses upper and lower extremities, alert and oriented to person, place and time, clear speech, cranial nerves II-XII are intact, upper and lower extremity strength is 5/5, symmetrical, cerebellar testing of finger to nose is intact, coordination normal.

## 2024-03-25 NOTE — CARDIOLOGY SUMMARY
[de-identified] : 3/25/2024: A-paced, V-sensed, (HR 67 bpm), new TWI in inferior leads and in V3-V6, QTc 458 msec 7/27/2023: A-paced, V-sensed, SR, 1st degree AV block   HR 62bpm 6/26/2023 A-paced, V-sensed

## 2024-03-25 NOTE — REASON FOR VISIT
[___ Device Check] : is here today for a [unfilled] device check for [Other ___] : [unfilled] [Other: _____] : [unfilled] [[employer] ] : provided by [employer]   [Time Spent: ____ minutes] : Total time spent using  services: [unfilled] minutes. The patient's primary language is not English thus required  services. [Interpreters_IDNumber] : [id number] [Interpreters_FullName] : Jenna [TWNoteComboBox1] : Aubree

## 2024-03-25 NOTE — ED PROVIDER NOTE - OBJECTIVE STATEMENT
89-year-old female with past medical history of hypertension and A-fib on Eliquis with permanent pacemaker who presents to the ED with a pressing sensation in her forehead.  Reports that symptoms started about a week ago; intermittent, and there is no alleviating or worsening factor.  It is documented in the triage note that patient has dizziness, but upon further questioning, patient denies any dizziness or lightheadedness and reports that her symptom is pressing sensation in her forehead.  Denies chest pain, shortness of breath, fever, nausea, vomiting, abdominal pain, urinary symptoms, and change with bowel movement.

## 2024-03-25 NOTE — PHYSICAL EXAM
[General Appearance - Well Developed] : well developed [Normal Appearance] : normal appearance [Well Groomed] : well groomed [General Appearance - Well Nourished] : well nourished [No Deformities] : no deformities [General Appearance - In No Acute Distress] : no acute distress [Heart Sounds] : normal S1 and S2 [Heart Rate And Rhythm] : heart rate and rhythm were normal [Edema] : no peripheral edema present [Murmurs] : no murmurs present [] : no respiratory distress [Respiration, Rhythm And Depth] : normal respiratory rhythm and effort [Exaggerated Use Of Accessory Muscles For Inspiration] : no accessory muscle use [Left Infraclavicular] : left infraclavicular area [Auscultation Breath Sounds / Voice Sounds] : lungs were clear to auscultation bilaterally [Clean] : clean [Dry] : dry [Abdomen Soft] : soft [Well-Healed] : well-healed [Nail Clubbing] : no clubbing of the fingernails [Cyanosis, Localized] : no localized cyanosis

## 2024-03-25 NOTE — ED PROVIDER NOTE - DIFFERENTIAL DIAGNOSIS
see mdm    Independent interpretation of the EKG performed by MD Glez  Independent interpretation of the X-Ray film(s) performed by MD Glez Differential Diagnosis

## 2024-03-25 NOTE — DISCUSSION/SUMMARY
[Pacemaker Function Normal] : normal pacemaker function [Patient] : the patient [Family] : the patient's family [FreeTextEntry1] : We had an extensive conversation regarding the nature of atrial fibrillation, including potential etiologies, underlying pathophysiology and natural history of the disease. In addition, the potential risk of thromboembolic events and assessment of that risk were discussed. I have emphasized the importance of continuing anticoagulation.

## 2024-03-25 NOTE — CONSULT NOTE ADULT - SUBJECTIVE AND OBJECTIVE BOX
**STROKE CODE CONSULT NOTE**    HPI: 89y Female with PMHx of afib (on eliquis 2.5mg), htn, and permanent pacemaker, presents for week in a half of "forehead pressure" that is intermittent pt denies current HA, last HA was this morning. Pt states pressure comes and goes nothing makes it worse. She states she had this pressure years ago before she got her pacemaker, and when she received the pacemaker it resolved. Pt states she got her pacemaker interrogated and nothing was found. Pt denies numbness/tingling in extremities, memory issues, LOC, and imbalence. Pt admits to chronic left leg pain and left vision loss which has been chronic for a few years.     T(C): 35.8 (03-25-24 @ 13:31), Max: 35.8 (03-25-24 @ 13:31)  HR: 83 (03-25-24 @ 13:31) (83 - 83)  BP: 139/65 (03-25-24 @ 13:31) (139/65 - 139/65)  RR: 18 (03-25-24 @ 13:31) (18 - 18)  SpO2: 97% (03-25-24 @ 13:31) (97% - 97%)    PAST MEDICAL & SURGICAL HISTORY:  HTN (hypertension)      Insomnia      H/O cardiac pacemaker          FAMILY HISTORY:  No pertinent family history in first degree relatives        SOCIAL HISTORY:  Denies smoking, drinking, or drug use    ROS: see hpi     MEDICATIONS  (STANDING):    MEDICATIONS  (PRN):    Home Medications:  acetaminophen 325 mg oral tablet: 2 tab(s) orally every 4 hours, As needed, Moderate Pain (4 - 6) (11 May 2018 14:32)  amLODIPine 5 mg oral tablet: 1 tab(s) orally once a day (at bedtime) (06 May 2018 05:29)  Melatonin 5 mg oral tablet: 1 tab(s) orally once a day (at bedtime) (06 May 2018 05:29)  NexIUM 40 mg oral delayed release capsule: 1 cap(s) orally once a day (06 May 2018 05:29)  valsartan 160 mg oral tablet: 1 tab(s) orally once a day (11 May 2018 08:18)      Allergies    No Known Allergies    Intolerances      Vital Signs Last 24 Hrs  T(C): 35.8 (25 Mar 2024 13:31), Max: 35.8 (25 Mar 2024 13:31)  T(F): 96.5 (25 Mar 2024 13:31), Max: 96.5 (25 Mar 2024 13:31)  HR: 83 (25 Mar 2024 13:31) (83 - 83)  BP: 139/65 (25 Mar 2024 13:31) (139/65 - 139/65)  BP(mean): --  RR: 18 (25 Mar 2024 13:31) (18 - 18)  SpO2: 97% (25 Mar 2024 13:31) (97% - 97%)    Parameters below as of 25 Mar 2024 13:31  Patient On (Oxygen Delivery Method): room air        Physical exam:  General: No acute distress, awake and alert  Cardiovascular: Regular rate and rhythm, no murmurs, rubs, or gallops. No bruits  Pulmonary: Anterior breath sounds clear bilaterally, no crackles or wheezing. No use of accessory muscles  Extremities: Radial and DP pulses +2, no edema    Neurologic:  -Mental status: Awake, alert, oriented to person, place, and time. Speech is fluent with intact naming, repetition, and comprehension, no dysarthria. Recent and remote memory intact. Follows commands. Attention/concentration intact. Fund of knowledge appropriate.  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 Pupils equally round and reactive to light  V:  Facial sensation V1-V3 equal and intact   VII: Face is symmetric with normal eye closure and smile  VIII: Hearing is grossly intact   Motor: Normal bulk and tone. No pronator drift. Strength bilateral upper extremity 5/5, bilateral lower extremities 5/5.  Sensation: Intact to light touch bilaterally. No neglect or extinction on double simultaneous testing.  Coordination: No dysmetria on finger-to-nose     NIHSS: 2 (chronic finding of left eye blindness )    Fingerstick Blood Glucose: CAPILLARY BLOOD GLUCOSE        LABS:                        12.0   5.11  )-----------( 210      ( 25 Mar 2024 16:10 )             36.2     03-25    142  |  108  |  27<H>  ----------------------------<  93  4.8   |  21  |  1.3    Ca    9.2      25 Mar 2024 16:10    TPro  7.2  /  Alb  4.4  /  TBili  0.7  /  DBili  x   /  AST  23  /  ALT  11  /  AlkPhos  60  03-25    PT/INR - ( 25 Mar 2024 16:10 )   PT: 14.30 sec;   INR: 1.25 ratio         PTT - ( 25 Mar 2024 16:10 )  PTT:35.9 sec      Urinalysis Basic - ( 25 Mar 2024 16:10 )    Color: x / Appearance: x / SG: x / pH: x  Gluc: 93 mg/dL / Ketone: x  / Bili: x / Urobili: x   Blood: x / Protein: x / Nitrite: x   Leuk Esterase: x / RBC: x / WBC x   Sq Epi: x / Non Sq Epi: x / Bacteria: x        RADIOLOGY & ADDITIONAL STUDIES:    HCT:    IMPRESSION:  Suggestion of focal gray-white distinction loss in the left parietal lobe potentially reflecting acute or subacute infarct. An MRI of the brain is recommended for further evaluation.    Rounded soft tissue nodule there is noted in the region of the right MCA bifurcation potentially reflecting aneurysm. CTA of the brain is recommended for further evaluation.    CTA: pending       -----------------------------------------------------------------------------------------------------------------

## 2024-03-25 NOTE — ASSESSMENT
[FreeTextEntry1] : # Chest Pain, new TWI in inferior leads and V3-V6 - Advised pt to go to ER given symptoms. At first, pt complained of chest pain and tightness, but after my recommendation for her to go to the ER, daughter states pt is having headache and severe dizziness. Also has sig belching, which is new and could represent anginal equivalent. Patient is agreeable to going to the ER. EMS notified.  # Dizziness - Rec ER visit. Check orthostatics, may need IVF, and neuro eval.  # SSS s/p DC PPM (2018) - Device interrogated and reprogrammed as described in procedure. Device function normal. All parameters stable. No events. See Device Printout - Remote monitor is set up and patient is transmitting.  # Paroxysmal AFib - Paroxysmal AF seen in Sept 2019 (~ 1 hr).  - Cont Amio 100 mg daily. Xarelto d/c'ed by PCP due to blood in her stools. Cont Eliquis 2.5 mg BID for CHADS VASc at least 4 (HTN, Age > 75, F). Denies any signs/symptoms of bleeding.  If she cannot tolerate OAC, she may be a good candidate for left atrial appendage occlusion device. - Follow up with ophtho and pulm. Pt is not interested in following up with either. - 2D echo prev ordered, but pt refused.  # H/o DVT? - Xarelto discontinued by PCP due to blood in her stool  # HTN - BP well controlled - Cont Amlodipine 5 mg daily and Telmisartan 40 mg daily - 2g Na diet enforced   I have also advised the patient to go to the nearest emergency room if she experiences any chest pain, dyspnea, syncope, or has any other compelling symptoms.   Follow up after hospitalization.

## 2024-03-25 NOTE — ED PROVIDER NOTE - PROGRESS NOTE DETAILS
Spoke with neurology team regarding to the CT and the CT finding. Neurology team recommended MRI and neuroendovascular consult in the morning for the finding of aneurysm.  Patient is aware of the plan and agrees.

## 2024-03-25 NOTE — PROCEDURE
[No] : not [NSR] : normal sinus rhythm [See Device Printout] : See device printout [See Scanned Paceart Report] : See scanned paceart report [Pacemaker] : pacemaker [Voltage: ___ volts] : Voltage was [unfilled] volts [Longevity: ___ months] : The estimated remaining battery life is [unfilled] months [Threshold Testing Performed] : Threshold testing was performed [Atrial] : Atrial [Ventricular] : Ventricular [Sensing Amplitude ___mv] : sensing amplitude was [unfilled] mv [Lead Imp:  ___ohms] : lead impedance was [unfilled] ohms [___V @] : [unfilled] V [___ ms] : [unfilled] ms [Programmed for Longevity] : output reprogrammed for improved battery longevity [de-identified] : [employer] [de-identified] : [id number] [de-identified] : JOCELYNN [de-identified] : 5/10/18 [de-identified] : AAI [de-identified] : 60 [de-identified] : AP:: 99.9% // : 41.4% No events Transmitting on [facility]

## 2024-03-25 NOTE — ED CDU PROVIDER INITIAL DAY NOTE - PROGRESS NOTE DETAILS
Pt has been reevaluated and in no acute distress. Will proceed with MRI and neuroendovascular consult in the morning.

## 2024-03-25 NOTE — ED PROVIDER NOTE - WHICH SHOWED
EKG: pacemaker rhythm at 67 bpm, normal intervals, no contiguous lead changes suspicious for ischemia

## 2024-03-25 NOTE — CONSULT NOTE ADULT - ASSESSMENT
ASSESSMENT/PLAN:    89y Female w/ PMH of 89y Female with PMHx of afib (on eliquis 2.5mg), htn, and permanent pacemaker, presents for week in a half of "forehead pressure" that is intermittent pt denies current HA, last HA was this morning. Pt states pressure comes and goes nothing makes it better or worse. NIHSS is 2 for chronic right eye vision loss, no acute focal deficits. CTH reported focal grey white distinction loss in parietal lobe, potentially reflecting acute or subacute infarct, CTA pending. Definitively rule out neurovascular cause w MRI, follow up CTA.     PLAN-  - continue home eliquis  - MRI brain noncontrast  - Obs     Discussed w Dr. Flores        ASSESSMENT/PLAN:    89y Female w/ PMH of 89y Female with PMHx of afib (on eliquis 2.5mg), htn, and permanent pacemaker, presents for week in a half of "forehead pressure" that is intermittent pt denies current HA, last HA was this morning. Pt states pressure comes and goes nothing makes it better or worse. NIHSS is 2 for chronic right eye vision loss, no acute focal deficits. CTH reported focal grey white distinction loss in parietal lobe, potentially reflecting acute or subacute infarct.  Definitively rule out neurovascular cause w MRI.  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    PLAN-  - continue home eliquis  - MRI brain noncontrast  - Obs   - regarding CTA findings of aneurysms, please consult Neurosurgery [id number] which can be done in AM    Discussed w Dr. Flores        ASSESSMENT/PLAN:    89y Female w/ PMH of 89y Female with PMHx of afib (on eliquis 2.5mg), htn, and permanent pacemaker, presents for week in a half of "forehead pressure" that is intermittent pt denies current HA, last HA was this morning. Pt states pressure comes and goes nothing makes it better or worse. NIHSS is 2 for chronic right eye vision loss, no acute focal deficits. CTH reported focal grey white distinction loss in parietal lobe, potentially reflecting acute or subacute infarct.  Definitively rule out neurovascular cause w MRI.  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  This is an incidental finding.    PLAN-  - continue home eliquis  - MRI brain noncontrast  - Obs   - regarding CTA findings of aneurysms (incidental findings), can consult [facility] in AM    Discussed w Dr. Flores        ASSESSMENT/PLAN:    89y Female w/ PMH of 89y Female with PMHx of afib (on eliquis 2.5mg), htn, and permanent pacemaker, presents for week in a half of "forehead pressure" that is intermittent pt denies current HA, last HA was this morning. Pt states pressure comes and goes nothing makes it better or worse. NIHSS is 2 for chronic right eye vision loss, no acute focal deficits. CTH reported focal grey white distinction loss in parietal lobe, potentially reflecting acute or subacute infarct.  Definitively rule out neurovascular cause w MRI.  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  This is an incidental finding.    PLAN-  - continue home eliquis  - MRI brain noncontrast  - Obs   - regarding CTA findings of aneurysms (incidental findings), [facility] will see patient in AM    Discussed w Dr. Flores        ASSESSMENT/PLAN:    89y Female w/ PMH of 89y Female with PMHx of afib (on eliquis 2.5mg), htn, and permanent pacemaker, presents for week in a half of "forehead pressure" that is intermittent pt denies current HA, last HA was this morning. Pt states pressure comes and goes nothing makes it better or worse. NIHSS is 2 for chronic right eye vision loss, no acute focal deficits. CTH reported focal grey white distinction loss in parietal lobe, potentially reflecting acute or subacute infarct.  Definitively rule out neurovascular cause w MRI.  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  This is an incidental finding.     3/25 9.50pm: *Examined patient at bedside using [employer] ID # [id number] to examine patient and answer questions from the family regarding CTA findings. Patient examined at bedside, currently patient is denying any acute complaints. She denies any headache or new focal symptoms. Nonfocal neuro exam currently. Patient and family informed that patient will be seen by neuro endovascular team in am for the CTA findings. And no acute intervention needed at this time. Family verbalizes understanding    PLAN-  -Continue patient in Observation  - Continue home Eliquis  - MRI brain noncontrast  - Neuroend[facility] evaluation in am for CTA findings of Left carotid terminus aneurysms   -Discussed with neuro attending on call Dr Flores.

## 2024-03-25 NOTE — HISTORY OF PRESENT ILLNESS
[de-identified] : Cardio: None PCP: Dr. Héctor Quevedo  89 yo Cantonese speaking F with history of HTN, SSS s/p DC PPM implanted by Dr. Frias in 2018 and paroxysmal atrial fibrillation here for routine pacemaker evaluation. She was admitted 5/6-5/11/2018 for dizziness.   Daughter states her mother had a blood clot in her left leg, for which she is on Xarelto. This was ~2019.   She presents for routine follow up visit. Has not been to the office in almost 2 years due to the pandemic. Xarelto has been discontinued by PCP for blood in her stools Did not have GI work up and did not require transfusions. She complains of dizziness and tightness in her head around her eye. She denies chest pain, dyspnea, palpitations, presyncope or syncope.   3/25/24 Here for routine device check. Complaining of "tightness and pressure" in her head. Occurs even at rest. Not sure if she is having chest pain with exertion but does complain of weakness. Belching more as well. C/o severe dizziness whenever she moves her head. No dyspnea or palpitations.

## 2024-03-25 NOTE — ED PROVIDER NOTE - CLINICAL SUMMARY MEDICAL DECISION MAKING FREE TEXT BOX
This patient presents with symptoms concerning for acute CVA. Ct with aneurysms. Other items on the differential include dissection, AMI, hypoglycemia or other metabolic derangement such as hepatic/uremic encephalopathy, medication side effect, or post-ictal Hernesto’s paralysis. However, presentation most concerning for a CVA. EKG without evidence of STEMI or ischemia, labs with no hypoglycemia, metabolic derangements, and clinical picture does not suggest other stroke mimic. Per neuro place in OBS for MRI. Consult [facility] in AM for aneurysm.

## 2024-03-25 NOTE — ED PROVIDER NOTE - PHYSICAL EXAMINATION
CONSTITUTIONAL: in no apparent distress.   HEAD: Normocephalic; atraumatic.   ENT: Hearing is intact with good acuity to spoken voice.  Patient is speaking clearly, not muffled and airway is intact.   RESPIRATORY: No signs of respiratory distress. Lung sounds are clear in all lobes bilaterally without rales, rhonchi, or wheezes.  CARDIOVASCULAR: Regular rate and rhythm.   GI: Abdomen is soft, non-tender, and without distention. Bowel sounds are present and normoactive in all four quadrants. No masses are noted.   NEURO: A & O x 3. Normal speech. There is no eye deviation. Facial sensation is intact to light touch. Face is symmetric with normal eye closure and smile. Hearing is normal to spoken voice. Phonation is normal. No upper or lower extremity drift.  PSYCHOLOGICAL: Appropriate mood and affect. Good judgement and insight.

## 2024-03-26 NOTE — ED CDU PROVIDER SUBSEQUENT DAY NOTE - CLINICAL SUMMARY MEDICAL DECISION MAKING FREE TEXT BOX
Labs and EKG were ordered and reviewed.  Imaging was ordered and reviewed by me.  Appropriate medications for patient's presenting complaints were ordered and effects were reassessed.  Patient's records (prior hospital, ED visit, and/or nursing home notes if available) were reviewed.  Additional history was obtained from EMS, family, and/or PCP (where available).  Escalation to admission/observation was considered.  Patient placed in obs for neuro workup.

## 2024-03-26 NOTE — ED CDU PROVIDER SUBSEQUENT DAY NOTE - WHICH SHOWED
ED EKG: my independent interpretation - Dr. Renato Lewis : [NSR, nl axis, nl intervals, no ST elevation]

## 2024-03-26 NOTE — H&P ADULT - HISTORY OF PRESENT ILLNESS
NEUROLOGY CONSULT    HPI: 89F right-handed cantonese speaking w/ PMH of HTN, Pacemaker, chronic L knee pain who presents with dizziness going on for one week she states that the dizziness does not feel like room spinning but just feels dizzy worse when she stands up. The symptoms started one week ago and initially she felt a headache that felt like pressure in the front of her head. She reports the symptoms feel similar to how she felt prior to having her pacemaker placed. She denies any nausea or vomiting.      MEDICATIONS  Home Medications:  acetaminophen 325 mg oral tablet: 2 tab(s) orally every 4 hours, As needed, Moderate Pain (4 - 6) (11 May 2018 14:32)  amLODIPine 5 mg oral tablet: 1 tab(s) orally once a day (at bedtime) (06 May 2018 05:29)  Melatonin 5 mg oral tablet: 1 tab(s) orally once a day (at bedtime) (06 May 2018 05:29)  NexIUM 40 mg oral delayed release capsule: 1 cap(s) orally once a day (06 May 2018 05:29)  valsartan 160 mg oral tablet: 1 tab(s) orally once a day (11 May 2018 08:18)    MEDICATIONS  (STANDING):  aMIOdarone    Tablet 100 milliGRAM(s) Oral daily  amLODIPine   Tablet 5 milliGRAM(s) Oral at bedtime  apixaban 2.5 milliGRAM(s) Oral every 12 hours  losartan 50 milliGRAM(s) Oral daily  magnesium oxide 400 milliGRAM(s) Oral two times a day with meals    MEDICATIONS  (PRN):      FAMILY HISTORY:  No pertinent family history in first degree relatives      SOCIAL HISTORY: negative for tobacco, alcohol, or ilicit drug use.    Allergies    No Known Allergies    Intolerances        Neurological Examination:  General:  Appearance is consistent with chronologic age.   Cognitive/Language:  Awake, alert, and oriented to person, place, time and date.  Recent and remote memory intact.  Fund of knowledge is appropriate.  Naming, repetition and comprehension intact. Nondysarthric.    Cranial Nerves  - Eyes: Visual fields full.  EOMI w/o nystagmus, skew or reported double vision.  PERRL.  No ptosis/weakness of eyelid closure.    - Face:  Facial sensation normal V1 - 3, no facial asymmetry.    - Ears/Nose/Throat:  Hearing grossly intact b/l to finger rub.  Palate elevates midline.  Tongue and uvula midline.   Motor exam: Normal tone and bulk. No tenderness, twitching, tremors or involuntary movements.            Upper extremity                  Bicep     Tricep     HG                                                 R      5/5 5/5 5/5                                                    L       5/5 5/5 5/5              Lower extremity                   HF        KE        DF         PF                                                  R     5/5 5/5 5/5 5/5                                               L      3/5      3/5       5/5        5/5    Sensory examination:  Intact to light touch and pinprick, pain, temperature and proprioception and vibration in all extremities.  Reflexes: 2+ b/l biceps, triceps, patella and achilles.  Plantar response downgoing b/l.  Julito, clonus absent.  Cerebellum: FTN/HKS intact.  No dysmetria.    Gait narrow based and normal.    LABS:                        12.0   5.11  )-----------( 210      ( 25 Mar 2024 16:10 )             36.2     03-25    142  |  108  |  27<H>  ----------------------------<  93  4.8   |  21  |  1.3    Ca    9.2      25 Mar 2024 16:10    TPro  7.2  /  Alb  4.4  /  TBili  0.7  /  DBili  x   /  AST  23  /  ALT  11  /  AlkPhos  60  03-25    Hemoglobin A1C:   Vitamin B12   PT/INR - ( 25 Mar 2024 16:10 )   PT: 14.30 sec;   INR: 1.25 ratio         PTT - ( 25 Mar 2024 16:10 )  PTT:35.9 sec  CAPILLARY BLOOD GLUCOSE          Urinalysis Basic - ( 25 Mar 2024 16:10 )    Color: x / Appearance: x / SG: x / pH: x  Gluc: 93 mg/dL / Ketone: x  / Bili: x / Urobili: x   Blood: x / Protein: x / Nitrite: x   Leuk Esterase: x / RBC: x / WBC x   Sq Epi: x / Non Sq Epi: x / Bacteria: x            Microbiology:      RADIOLOGY, EKG AND ADDITIONAL TESTS: Reviewed.  < from: CT Head No Cont (03.25.24 @ 16:08) >  Suggestion offocal gray-white distinction loss in the left parietal lobe   potentially reflecting acute or subacute infarct. An MRI of the brain is   recommended for further evaluation.    < end of copied text >  < from: CT Angio Neck w/ IV Cont (03.25.24 @ 19:22) >  CTA HEAD:  A total of three saccular aneurysms are noted arising from the left   carotid terminus, right anterior communicating artery as well as the   right MCA bifurcation, measuring up to 8mm further detailed above.    Intracranial vessels are otherwise patent without stenosis    CTA NECK:  No evidence of carotid or vertebral artery stenosis.    < end of copied text >           NEUROLOGY CONSULT    HPI: 89F right-handed cantonese speaking w/ PMH of HTN, afib, Pacemaker (placed in 2018), chronic L knee pain who presents with dizziness going on for one week she states that the dizziness does not feel like room spinning but just feels dizzy worse when she stands up. The symptoms started one week ago and initially she felt a headache that felt like pressure in the front of her head. She reports the symptoms feel similar to how she felt prior to having her pacemaker placed. She denies any nausea or vomiting.      MEDICATIONS  Home Medications:  acetaminophen 325 mg oral tablet: 2 tab(s) orally every 4 hours, As needed, Moderate Pain (4 - 6) (11 May 2018 14:32)  amLODIPine 5 mg oral tablet: 1 tab(s) orally once a day (at bedtime) (06 May 2018 05:29)  Melatonin 5 mg oral tablet: 1 tab(s) orally once a day (at bedtime) (06 May 2018 05:29)  NexIUM 40 mg oral delayed release capsule: 1 cap(s) orally once a day (06 May 2018 05:29)  valsartan 160 mg oral tablet: 1 tab(s) orally once a day (11 May 2018 08:18)    MEDICATIONS  (STANDING):  aMIOdarone    Tablet 100 milliGRAM(s) Oral daily  amLODIPine   Tablet 5 milliGRAM(s) Oral at bedtime  apixaban 2.5 milliGRAM(s) Oral every 12 hours  losartan 50 milliGRAM(s) Oral daily  magnesium oxide 400 milliGRAM(s) Oral two times a day with meals    MEDICATIONS  (PRN):      FAMILY HISTORY:  No pertinent family history in first degree relatives      SOCIAL HISTORY: negative for tobacco, alcohol, or ilicit drug use.    Allergies    No Known Allergies    Intolerances        Neurological Examination:  General:  Appearance is consistent with chronologic age.   Cognitive/Language:  Awake, alert, and oriented to person, place, time and date.  Recent and remote memory intact.  Fund of knowledge is appropriate.  Naming, repetition and comprehension intact. Nondysarthric.    Cranial Nerves  - Eyes: Visual fields full.  EOMI w/o nystagmus, skew or reported double vision.  PERRL.  No ptosis/weakness of eyelid closure.    - Face:  Facial sensation normal V1 - 3, no facial asymmetry.    - Ears/Nose/Throat:  Hearing grossly intact b/l to finger rub.  Palate elevates midline.  Tongue and uvula midline.   Motor exam: Normal tone and bulk. No tenderness, twitching, tremors or involuntary movements.            Upper extremity                  Bicep     Tricep     HG                                                 R      5/5 5/5 5/5                                                    L       5/5 5/5 5/5              Lower extremity                   HF        KE        DF         PF                                                  R     5/5 5/5 5/5 5/5                                               L      3/5      3/5       5/5        5/5    Sensory examination:  Intact to light touch and pinprick, pain, temperature and proprioception and vibration in all extremities.  Reflexes: 2+ b/l biceps, triceps, patella and achilles.  Plantar response downgoing b/l.  Julito, clonus absent.  Cerebellum: FTN/HKS intact.  No dysmetria.    Gait narrow based and normal.    LABS:                        12.0   5.11  )-----------( 210      ( 25 Mar 2024 16:10 )             36.2     03-25    142  |  108  |  27<H>  ----------------------------<  93  4.8   |  21  |  1.3    Ca    9.2      25 Mar 2024 16:10    TPro  7.2  /  Alb  4.4  /  TBili  0.7  /  DBili  x   /  AST  23  /  ALT  11  /  AlkPhos  60  03-25    Hemoglobin A1C:   Vitamin B12   PT/INR - ( 25 Mar 2024 16:10 )   PT: 14.30 sec;   INR: 1.25 ratio         PTT - ( 25 Mar 2024 16:10 )  PTT:35.9 sec  CAPILLARY BLOOD GLUCOSE          Urinalysis Basic - ( 25 Mar 2024 16:10 )    Color: x / Appearance: x / SG: x / pH: x  Gluc: 93 mg/dL / Ketone: x  / Bili: x / Urobili: x   Blood: x / Protein: x / Nitrite: x   Leuk Esterase: x / RBC: x / WBC x   Sq Epi: x / Non Sq Epi: x / Bacteria: x            Microbiology:      RADIOLOGY, EKG AND ADDITIONAL TESTS: Reviewed.  < from: CT Head No Cont (03.25.24 @ 16:08) >  Suggestion offocal gray-white distinction loss in the left parietal lobe   potentially reflecting acute or subacute infarct. An MRI of the brain is   recommended for further evaluation.    < end of copied text >  < from: CT Angio Neck w/ IV Cont (03.25.24 @ 19:22) >  CTA HEAD:  A total of three saccular aneurysms are noted arising from the left   carotid terminus, right anterior communicating artery as well as the   right MCA bifurcation, measuring up to 8mm further detailed above.    Intracranial vessels are otherwise patent without stenosis    CTA NECK:  No evidence of carotid or vertebral artery stenosis.    < end of copied text >           NEUROLOGY CONSULT    HPI: 89F right-handed cantonese speaking w/ PMH of HTN, afib, Pacemaker (placed in 2018), L eye cataract, chronic L knee pain who presents with dizziness going on for one week she states that the dizziness does not feel like room spinning but just feels dizzy worse when she stands up. The symptoms started one week ago and initially she felt a headache that felt like pressure in the front of her head. She reports the symptoms feel similar to how she felt prior to having her pacemaker placed. She denies any nausea or vomiting.      MEDICATIONS  Home Medications:  acetaminophen 325 mg oral tablet: 2 tab(s) orally every 4 hours, As needed, Moderate Pain (4 - 6) (11 May 2018 14:32)  amLODIPine 5 mg oral tablet: 1 tab(s) orally once a day (at bedtime) (06 May 2018 05:29)  Melatonin 5 mg oral tablet: 1 tab(s) orally once a day (at bedtime) (06 May 2018 05:29)  NexIUM 40 mg oral delayed release capsule: 1 cap(s) orally once a day (06 May 2018 05:29)  valsartan 160 mg oral tablet: 1 tab(s) orally once a day (11 May 2018 08:18)    MEDICATIONS  (STANDING):  aMIOdarone    Tablet 100 milliGRAM(s) Oral daily  amLODIPine   Tablet 5 milliGRAM(s) Oral at bedtime  apixaban 2.5 milliGRAM(s) Oral every 12 hours  losartan 50 milliGRAM(s) Oral daily  magnesium oxide 400 milliGRAM(s) Oral two times a day with meals    MEDICATIONS  (PRN):      FAMILY HISTORY:  No pertinent family history in first degree relatives      SOCIAL HISTORY: negative for tobacco, alcohol, or ilicit drug use.    Allergies    No Known Allergies    Intolerances        Neurological Examination:  General:  Appearance is consistent with chronologic age.   Cognitive/Language:  Awake, alert, and oriented to person, place, time and date.  Recent and remote memory intact.  Fund of knowledge is appropriate.  Naming, repetition and comprehension intact. Nondysarthric.    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  EOMI w/o nystagmus, skew or reported double vision.  PERRL.  No ptosis/weakness of eyelid closure.    - Face:  Facial sensation normal V1 - 3, no facial asymmetry.    - Ears/Nose/Throat:  Hearing grossly intact b/l to finger rub.  Palate elevates midline.  Tongue and uvula midline.   Motor exam: Normal tone and bulk. No tenderness, twitching, tremors or involuntary movements.            Upper extremity                  Bicep     Tricep     HG                                                 R      5/5 5/5 5/5                                                    L       5/5 5/5 5/5              Lower extremity                   HF        KE        DF         PF                                                  R     5/5 5/5 5/5 5/5                                               L      3/5      3/5       5/5        5/5    Sensory examination:  Intact to light touch and pinprick, pain, temperature and proprioception and vibration in all extremities.  Reflexes: 2+ b/l biceps, triceps, patella and achilles.  Plantar response downgoing b/l.  Julito, clonus absent.  Cerebellum: FTN/HKS intact.  No dysmetria.    Gait narrow based and normal.    LABS:                        12.0   5.11  )-----------( 210      ( 25 Mar 2024 16:10 )             36.2     03-25    142  |  108  |  27<H>  ----------------------------<  93  4.8   |  21  |  1.3    Ca    9.2      25 Mar 2024 16:10    TPro  7.2  /  Alb  4.4  /  TBili  0.7  /  DBili  x   /  AST  23  /  ALT  11  /  AlkPhos  60  03-25    Hemoglobin A1C:   Vitamin B12   PT/INR - ( 25 Mar 2024 16:10 )   PT: 14.30 sec;   INR: 1.25 ratio         PTT - ( 25 Mar 2024 16:10 )  PTT:35.9 sec  CAPILLARY BLOOD GLUCOSE          Urinalysis Basic - ( 25 Mar 2024 16:10 )    Color: x / Appearance: x / SG: x / pH: x  Gluc: 93 mg/dL / Ketone: x  / Bili: x / Urobili: x   Blood: x / Protein: x / Nitrite: x   Leuk Esterase: x / RBC: x / WBC x   Sq Epi: x / Non Sq Epi: x / Bacteria: x            Microbiology:      RADIOLOGY, EKG AND ADDITIONAL TESTS: Reviewed.  < from: CT Head No Cont (03.25.24 @ 16:08) >  Suggestion offocal gray-white distinction loss in the left parietal lobe   potentially reflecting acute or subacute infarct. An MRI of the brain is   recommended for further evaluation.    < end of copied text >  < from: CT Angio Neck w/ IV Cont (03.25.24 @ 19:22) >  CTA HEAD:  A total of three saccular aneurysms are noted arising from the left   carotid terminus, right anterior communicating artery as well as the   right MCA bifurcation, measuring up to 8mm further detailed above.    Intracranial vessels are otherwise patent without stenosis    CTA NECK:  No evidence of carotid or vertebral artery stenosis.    < end of copied text >           Cantonese  offered, family at the bedside prefers to provide translation instead.     HPI:  89F right-handed Cantonese speaking w/ PMH of HTN, Afib, Pacemaker (placed in 2018), L eye cataract, chronic L knee pain who presents with dizziness going on for one week. She states that the dizziness does not feel like room spinning but just feels dizzy worse when she stands up. The symptoms started one week ago and initially she felt a headache that felt like pressure in the front of her head. She reports the symptoms feel similar to how she felt prior to having her pacemaker placed. She denies any nausea or vomiting.  CTH negative or acute pathology. 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      FAMILY HISTORY:  No pertinent family history in first degree relatives      SOCIAL HISTORY: negative for tobacco, alcohol, or ilicit drug use.    Allergies    No Known Allergies    Intolerances        Neurological Examination:    -Mental status: Awake, alert, oriented to person, place, and time. Speech is fluent with intact naming, repetition, and comprehension, no dysarthria. Recent and remote memory intact. Follows commands.   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 Pupils equally round and reactive to light  V:  Facial sensation V1-V3 equal and intact   VII: Face is symmetric with normal eye closure and smile  VIII: Hearing is grossly intact   Motor: Normal bulk and tone. No pronator drift. Strength bilateral upper and LE  Sensation: Intact to light touch bilaterally. No neglect or extinction on double simultaneous testing.  Coordination: No dysmetria on finger-to-nose     NIHSS: 2 (chronic finding of left eye blindness )    LABS:                        12.0   5.11  )-----------( 210      ( 25 Mar 2024 16:10 )             36.2     03-25    142  |  108  |  27<H>  ----------------------------<  93  4.8   |  21  |  1.3    Ca    9.2      25 Mar 2024 16:10    TPro  7.2  /  Alb  4.4  /  TBili  0.7  /  DBili  x   /  AST  23  /  ALT  11  /  AlkPhos  60  03-25    Hemoglobin A1C:   Vitamin B12   PT/INR - ( 25 Mar 2024 16:10 )   PT: 14.30 sec;   INR: 1.25 ratio         PTT - ( 25 Mar 2024 16:10 )  PTT:35.9 sec  CAPILLARY BLOOD GLUCOSE          Urinalysis Basic - ( 25 Mar 2024 16:10 )    Color: x / Appearance: x / SG: x / pH: x  Gluc: 93 mg/dL / Ketone: x  / Bili: x / Urobili: x   Blood: x / Protein: x / Nitrite: x   Leuk Esterase: x / RBC: x / WBC x   Sq Epi: x / Non Sq Epi: x / Bacteria: x            Microbiology:      RADIOLOGY, EKG AND ADDITIONAL TESTS: Reviewed.  < from: CT Head No Cont (03.25.24 @ 16:08) >  Suggestion offocal gray-white distinction loss in the left parietal lobe   potentially reflecting acute or subacute infarct. An MRI of the brain is   recommended for further evaluation.    < end of copied text >  < from: CT Angio Neck w/ IV Cont (03.25.24 @ 19:22) >  CTA HEAD:  A total of three saccular aneurysms are noted arising from the left   carotid terminus, right anterior communicating artery as well as the   right MCA bifurcation, measuring up to 8mm further detailed above.    Intracranial vessels are otherwise patent without stenosis    CTA NECK:  No evidence of carotid or vertebral artery stenosis.

## 2024-03-26 NOTE — ED CDU PROVIDER SUBSEQUENT DAY NOTE - PROGRESS NOTE DETAILS
Pt has been reevaluated and in no acute distress. Will proceed with MRI and neuroendovascular consult in the morning. Received sign out this AM. Neurology and neuroendovascular called and patient was admitted under Neurology service as he will go for angiogram today.

## 2024-03-26 NOTE — H&P ADULT - ASSESSMENT
89F right-handed Cantonese speaking w/ PMH of HTN, Afib, Pacemaker (placed in 2018), L eye cataract, chronic L knee pain who presents with dizziness going on for one week. CTH negative or acute pathology. 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 Currently admitted to neurovascular for monitoring and  DSA.    Neuro  #Saccular aneurysms arising from the left carotid terminus, right anterior communicating artery as well as the right MCA bifurcation, measuring up to 8mm.  - Q 8 neuro checks  - obtain MRI Brain without contrast   - Plan for DSA after MRI   - NPO except medications before DSA, IVF while NPO as tolerated   - BP < 140/80 for aneurysm precautions       Cards  #HTN  #Afib   #S/p PPM placement for tachy-haley syndrome  - Goal SBP <140  - On Telmisartan 40mg OD, will start losartan 50 mg while inpatient  - C/w Amiodarone 100mg  daily  - C/w Amlodipine 5 mg daily  - C/w Eliquis 2.5 mg BID  - EP followup as outpatient      Pulm  - call provider if SPO2 < 94%    GI  #Nutrition/Fluids/Electrolytes   - replete K<4 and Mg <2  - Diet:: Regular  - IVF: Only when NPO    Renal  - Monitor BMP    Infectious Disease  - Afebrile    Endocrine  #Keep -180    DVT Prophylaxis  - Eliquis 2.5mg BID      IDR Goals: Goals reviewed at interdisciplinary rounds with case management, social work, physical therapy, occupational therapy, and speech language pathology.   Please see specific therapy  notes for in depth goals.  Dispo: **********(Acute rehab - can tolerate 3 hours of therapy)     Discussed daily hospital plans and goals with patient and family at bedside. (Called and updated family.)    Discussed with Neurology Attending 89F right-handed Cantonese speaking w/ PMH of HTN, Afib, Pacemaker (placed in 2018), L eye cataract, chronic L knee pain who presents with dizziness going on for one week. CTH negative or acute pathology. 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 Currently admitted to neurovascular for monitoring and  DSA.    Neuro  #Saccular aneurysms arising from the left carotid terminus, right anterior communicating artery as well as the right MCA bifurcation, measuring up to 8mm.  - Q 8 neuro checks  - obtain MRI Brain without contrast   - Plan for DSA after MRI   - NPO except medications before DSA, IVF while NPO as tolerated   - BP < 140/80 for aneurysm precautions       Cards  #HTN  #Afib   #S/p PPM placement for tachy-haley syndrome  - Goal SBP <140  - On Telmisartan 40mg OD, will start losartan 50 mg while inpatient  - C/w Amiodarone 100mg  daily  - C/w Amlodipine 5 mg daily  - C/w Eliquis 2.5 mg BID  - EP followup as outpatient  - Trend Troponin      Pulm  - call provider if SPO2 < 94%    GI  #Nutrition/Fluids/Electrolytes   - replete K<4 and Mg <2  - Diet:: Regular  - IVF: Only when NPO    Renal  - Monitor BMP    Infectious Disease  - Afebrile    Endocrine  #Keep -180    DVT Prophylaxis  - Eliquis 2.5mg BID        Discussed with Neurology Attending

## 2024-03-26 NOTE — CONSULT NOTE ADULT - NS ATTEND AMEND GEN_ALL_CORE FT
Agree with plan as above    Check 2D echo  Cont Eliquis. Increase to 5 mg PO BID as she does not meet criteria for low dose

## 2024-03-26 NOTE — H&P ADULT - ATTENDING COMMENTS
89 year old woman w/ PMH of pAF (not on AC), PPM for SSS (PPM interrogated 3/25/24 w/ EP with no episodes) has been having dizziness for one week and head tightness. Sent in by EP as patient was endorsing dizziness and chest pain in the office. Patient found to have multiple incidental aneurysms.     Normal neurological exam    CTH neg  CTA h/n R. MCA bifurcation 5mm aneurysm; 3mm R. Acom aneurysm; L. carotid terminus 8mm aneurysm.   EKG T wave abnormality, consider lateral ischemia    Imp:   1. Dizziness of unclear etiology -- unlikely ischemic. Will rule out cardiac etiology  2. Headache of unclear etiology - unlikely sentinel bleed given characteristic.   3. Multiple incidental unruptured saccular aneurysms   4. Paroxysmal Afib (CHADSVASC 4; Moderate-high risk of stroke)     Plan:   MR brain w/o contrast  Troponin, orthostatic  cardiology consult given dizziness workup and to discuss need for AC given pAF and CHADSVASC  NSG following (Sacho)   May need DSA to understand morphology of aneurysms  PT     75 minutes spent on total encounter. The necessity of the time spent during the encounter on this date of service was due to:     Review of imaging and chart; obtaining history; examination of pt; discussion and coordination of care, and discussion of lifestyle modification and risk factor control.

## 2024-03-26 NOTE — CONSULT NOTE ADULT - SUBJECTIVE AND OBJECTIVE BOX
Neuroendovascular Consult note:   Consulted for: Intracranial aneurysm   Aubree : [id number] - family at bedside     HPI:  Patient is a 89 year old female, right handed, cantonese speaking, history of HTN AFib on eliquis 2.5mg BID, pacemaker (2018), cataracts (chronic vision loss left eye):, chronic left knee pain (per family has been hurting her for the past few months) presented with sudden onset dizziness for one week with associated frontal headache and nausea. Patient has had similar episodes in the past prior to her pacemaker insertion. Presented to ED today due to severity of symptoms. Family at bedside. Nonsmoker, denies alcohol consumption. Denies falls, denies head trauma, LOC, chest pain, SOB. Denies changes to vision (chronic left eye blindness). 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       Past medical history:   HTN (hypertension)    Insomnia    GERD (gastroesophageal reflux disease)        Medications:  aMIOdarone    Tablet 100 milliGRAM(s) Oral daily  amLODIPine   Tablet 5 milliGRAM(s) Oral at bedtime  apixaban 2.5 milliGRAM(s) Oral every 12 hours  losartan 50 milliGRAM(s) Oral daily  magnesium oxide 400 milliGRAM(s) Oral two times a day with meals      Vitals:   T(F): 98.6 (03-26-24 @ 07:55), Max: 98.6 (03-26-24 @ 07:55)  HR: 60 (03-26-24 @ 07:55) (60 - 83)  BP: 141/62 (03-26-24 @ 07:55) (123/69 - 141/65)  RR: 18 (03-26-24 @ 07:55) (18 - 18)  SpO2: 96% (03-26-24 @ 07:55) (96% - 98%)    Labs:                         12.0   5.11  )-----------( 210      ( 25 Mar 2024 16:10 )             36.2     03-25    142  |  108  |  27<H>  ----------------------------<  93  4.8   |  21  |  1.3    Ca    9.2      25 Mar 2024 16:10    TPro  7.2  /  Alb  4.4  /  TBili  0.7  /  DBili  x   /  AST  23  /  ALT  11  /  AlkPhos  60  03-25    PT/INR - ( 25 Mar 2024 16:10 )   PT: 14.30 sec;   INR: 1.25 ratio         PTT - ( 25 Mar 2024 16:10 )  PTT:35.9 sec  LIVER FUNCTIONS - ( 25 Mar 2024 16:10 )  Alb: 4.4 g/dL / Pro: 7.2 g/dL / ALK PHOS: 60 U/L / ALT: 11 U/L / AST: 23 U/L / GGT: x             Exam:   General - NAD   Neuro- AAO3, follows commands, EOMi, left eye chronic blindness 2/2 cataracts, face symmetric, moving all extremities with antigravity limitation 2/2 chronic pain left leg, sensation intact, no neglect, no aphasia, no dsyarthria, no dysmetria on finger to nose     Radiology:   < from: CT Angio Neck w/ IV Cont (03.25.24 @ 19:22) >  IMPRESSION:    CTA HEAD:  A total of three saccular aneurysms are noted arising from the left   carotid terminus, right anterior communicating artery as well as the   right MCA bifurcation, measuring up to 8mm further detailed above.    Intracranial vessels are otherwise patent without stenosis    CTA NECK:  No evidence of carotid or vertebral artery stenosis.    < end of copied text >  < from: CT Head No Cont (03.25.24 @ 16:08) >  IMPRESSION:  Suggestion offocal gray-white distinction loss in the left parietal lobe   potentially reflecting acute or subacute infarct. An MRI of the brain is   recommended for further evaluation.    Rounded soft tissue nodule there is noted in the region of the right MCA   bifurcation potentially reflecting aneurysm. CTA of the brain is   recommended for further evaluation.    < end of copied text >      Assessment:   Patient is a 89 year old female, right handed, cantonese speaking, history of HTN AFib on eliquis 2.5mg BID, pacemaker (2018), cataracts (chronic vision loss left eye):, chronic left knee pain (per family has been hurting her for the past few months) presented with sudden onset dizziness for one week with associated frontal headache and nausea. Patient has had similar episodes in the past prior to her pacemaker insertion. Presented to ED today due to severity of symptoms. Family at bedside. Nonsmoker, denies alcohol consumption. Denies falls, denies head trauma, LOC, chest pain, SOB. Denies changes to vision (chronic left eye blindness). 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     Suggestions:  NPO except medications for angiogram planning possibly today pending IR availability   MRI brain- expedite if possible   BP < 140/80 for aneurysm precautions   IVF while NPO as tolerated   Admission per stroke team   Discussed with Dr Rothman   [phone]  Neuroendovascular Consult note:   Consulted for: Intracranial aneurysm   Aubree : [id number] - family at bedside     HPI:  Patient is a 89 year old female, right handed, cantonese speaking, history of HTN AFib on eliquis 2.5mg BID, pacemaker (2018), cataracts (chronic vision loss left eye):, chronic left knee pain (per family has been hurting her for the past few months) presented with sudden onset dizziness for one week with associated frontal headache and nausea. Patient has had similar episodes in the past prior to her pacemaker insertion. Presented to ED today due to severity of symptoms. Family at bedside. Nonsmoker, denies alcohol consumption. Denies falls, denies head trauma, LOC, chest pain, SOB. Denies changes to vision (chronic left eye blindness). 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       Past medical history:   HTN (hypertension)    Insomnia    GERD (gastroesophageal reflux disease)        Medications:  aMIOdarone    Tablet 100 milliGRAM(s) Oral daily  amLODIPine   Tablet 5 milliGRAM(s) Oral at bedtime  apixaban 2.5 milliGRAM(s) Oral every 12 hours  losartan 50 milliGRAM(s) Oral daily  magnesium oxide 400 milliGRAM(s) Oral two times a day with meals      Vitals:   T(F): 98.6 (03-26-24 @ 07:55), Max: 98.6 (03-26-24 @ 07:55)  HR: 60 (03-26-24 @ 07:55) (60 - 83)  BP: 141/62 (03-26-24 @ 07:55) (123/69 - 141/65)  RR: 18 (03-26-24 @ 07:55) (18 - 18)  SpO2: 96% (03-26-24 @ 07:55) (96% - 98%)    Labs:                         12.0   5.11  )-----------( 210      ( 25 Mar 2024 16:10 )             36.2     03-25    142  |  108  |  27<H>  ----------------------------<  93  4.8   |  21  |  1.3    Ca    9.2      25 Mar 2024 16:10    TPro  7.2  /  Alb  4.4  /  TBili  0.7  /  DBili  x   /  AST  23  /  ALT  11  /  AlkPhos  60  03-25    PT/INR - ( 25 Mar 2024 16:10 )   PT: 14.30 sec;   INR: 1.25 ratio         PTT - ( 25 Mar 2024 16:10 )  PTT:35.9 sec  LIVER FUNCTIONS - ( 25 Mar 2024 16:10 )  Alb: 4.4 g/dL / Pro: 7.2 g/dL / ALK PHOS: 60 U/L / ALT: 11 U/L / AST: 23 U/L / GGT: x             Exam:   General - NAD   Neuro- AAO3, follows commands, EOMi, left eye chronic blindness 2/2 cataracts, face symmetric, moving all extremities with antigravity limitation 2/2 chronic pain left leg, sensation intact, no neglect, no aphasia, no dsyarthria, no dysmetria on finger to nose     Radiology:   < from: CT Angio Neck w/ IV Cont (03.25.24 @ 19:22) >  IMPRESSION:    CTA HEAD:  A total of three saccular aneurysms are noted arising from the left   carotid terminus, right anterior communicating artery as well as the   right MCA bifurcation, measuring up to 8mm further detailed above.    Intracranial vessels are otherwise patent without stenosis    CTA NECK:  No evidence of carotid or vertebral artery stenosis.    < end of copied text >  < from: CT Head No Cont (03.25.24 @ 16:08) >  IMPRESSION:  Suggestion offocal gray-white distinction loss in the left parietal lobe   potentially reflecting acute or subacute infarct. An MRI of the brain is   recommended for further evaluation.    Rounded soft tissue nodule there is noted in the region of the right MCA   bifurcation potentially reflecting aneurysm. CTA of the brain is   recommended for further evaluation.    < end of copied text >      Assessment:   Patient is a 89 year old female, right handed, cantonese speaking, history of HTN AFib on eliquis 2.5mg BID, pacemaker (2018), cataracts (chronic vision loss left eye):, chronic left knee pain (per family has been hurting her for the past few months) presented with sudden onset dizziness for one week with associated frontal headache and nausea. Patient has had similar episodes in the past prior to her pacemaker insertion. Presented to ED today due to severity of symptoms. Family at bedside. Nonsmoker, denies alcohol consumption. Denies falls, denies head trauma, LOC, chest pain, SOB. Denies changes to vision (chronic left eye blindness). 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     Suggestions:  NPO except medications for angiogram planning possibly today   MRI brain- expedite if possible   BP < 140/80 for aneurysm precautions   IVF while NPO as tolerated   Admission per stroke team   Discussed with Dr Rothman   [id number]     UPDATE 1128: Per Dr Rothman, prefer to obtain MRI brain prior to angiogram planning. Patient can come off NPO unless otherwise medically indicated. Discussed with MRI, will attempt to escalate imaging for today however need to coordinate with tech to interrogate pacemaker. Updated primary.

## 2024-03-26 NOTE — ED CDU PROVIDER DISPOSITION NOTE - NS ED ATTENDING STATEMENT MOD
Hemigard Postcare Instructions: The HEMIGARD strips are to remain completely dry for at least 5-7 days. Attending with

## 2024-03-26 NOTE — CONSULT NOTE ADULT - ASSESSMENT
89F right-handed Cantonese speaking w/ PMH of HTN, Afib, Pacemaker (placed in 2018), L eye cataract, chronic L knee pain who presents with dizziness going on for one week.  CT head shows possible acute to subacute CVA.  CT Angio head shows 3 saccular aneurysms.    Plan  Pt is being admitted to stroke team  Monitor on tele  Cont Eliquis if no contraindications  MRI and angio per neuro recs  check echo 89F right-handed Cantonese speaking w/ PMH of HTN, Afib, Pacemaker (placed in 2018), L eye cataract, chronic L knee pain who presents with dizziness going on for one week.  CT head shows possible acute to subacute CVA.  CT Angio head shows 3 saccular aneurysms. Pt with new TWI in office ECG.     Plan  Pt is being admitted to stroke team  Monitor on tele  Cont Eliquis if no contraindications  MRI and angio per neuro recs  check echo

## 2024-03-26 NOTE — ED CDU PROVIDER DISPOSITION NOTE - CLINICAL COURSE
89F right-handed Cantonese speaking w/ PMH of HTN, Afib, Pacemaker (placed in 2018), L eye cataract, chronic L knee pain who presents with dizziness going on for one week. She states that the dizziness does not feel like room spinning but just feels dizzy worse when she stands up. The symptoms started one week ago and initially she felt a headache that felt like pressure in the front of her head. She reports the symptoms feel similar to how she felt prior to having her pacemaker placed. She denies any nausea or vomiting.  CTH negative or acute pathology. 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 Neuro requested admission to their service after aneurysm noted.

## 2024-03-26 NOTE — CONSULT NOTE ADULT - SUBJECTIVE AND OBJECTIVE BOX
Patient is a 89y old  Female who presents with a chief complaint of     HPI:  89F right-handed Cantonese speaking w/ PMH of HTN, Afib, Pacemaker (placed in 2018), L eye cataract, chronic L knee pain who presents with dizziness going on for one week. She states that the dizziness does not feel like room spinning but just feels dizzy worse when she stands up. The symptoms started one week ago and initially she felt a headache that felt like pressure in the front of her head. She reports the symptoms feel similar to how she felt prior to having her pacemaker placed. She denies any nausea or vomiting.  CTH negative or acute pathology. 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    Head CT showed acute to subacute CVA    PAST MEDICAL & SURGICAL HISTORY:  HTN (hypertension)    Insomnia    Afib    Pacemaker  H/O cardiac pacemaker    PREVIOUS DIAGNOSTIC TESTING:      MEDICATIONS  (STANDING):  aMIOdarone   Tablet 100 milliGRAM(s) Oral daily  amLODIPine   Tablet 5 milliGRAM(s) Oral at bedtime  apixaban 2.5 milliGRAM(s) Oral every 12 hours  losartan 50 milliGRAM(s) Oral daily  magnesium oxide 400 milliGRAM(s) Oral two times a day with meals    MEDICATIONS  (PRN):  meclizine 25 milliGRAM(s) Oral every 6 hours PRN Dizziness      FAMILY HISTORY:  No pertinent family history in first degree relatives    SOCIAL HISTORY:  none  CIGARETTES:  none  ALCOHOL:  none  Past Surgical History:    Allergies:    No Known Allergies    REVIEW OF SYSTEMS:  negative    Vital Signs Last 24 Hrs  T(C): 37 (26 Mar 2024 07:55), Max: 37 (26 Mar 2024 07:55)  T(F): 98.6 (26 Mar 2024 07:55), Max: 98.6 (26 Mar 2024 07:55)  HR: 60 (26 Mar 2024 07:55) (60 - 61)  BP: 141/62 (26 Mar 2024 07:55) (123/69 - 141/65)  BP(mean): --  RR: 18 (26 Mar 2024 07:55) (18 - 18)  SpO2: 96% (26 Mar 2024 07:55) (96% - 98%)    Parameters below as of 26 Mar 2024 07:55  Patient On (Oxygen Delivery Method): room air    PHYSICAL EXAM:    GENERAL: In no apparent distress, well nourished, and hydrated.  HEART: Regular rate and rhythm; No murmurs, rubs, or gallops.  PULMONARY: Clear to auscultation and perfusion.  No rales, wheezing, or rhonchi bilaterally.  ABDOMEN: Soft, Nontender, Nondistended; Bowel sounds present  EXTREMITIES:  2+ Peripheral Pulses, No clubbing, cyanosis, or edema  NEUROLOGICAL: Grossly nonfocal    INTERPRETATION OF TELEMETRY:    ECG:  < from: 12 Lead ECG (03.25.24 @ 13:38) >  Ventricular Rate 67 BPM    Atrial Rate 67 BPM    P-R Interval 252 ms    QRS Duration 84 ms    Q-T Interval 434 ms    QTC Calculation(Bazett) 458 ms    P Axis 43 degrees    R Axis 39 degrees    T Axis -43 degrees    Diagnosis Line Atrial-paced rhythm with prolonged AV conduction  T wave abnormality, consider lateral ischemia  Abnormal ECG    < end of copied text >        LABS:                        12.0   5.11  )-----------( 210      ( 25 Mar 2024 16:10 )             36.2     03-25    142  |  108  |  27<H>  ----------------------------<  93  4.8   |  21  |  1.3    Ca    9.2      25 Mar 2024 16:10    TPro  7.2  /  Alb  4.4  /  TBili  0.7  /  DBili  x   /  AST  23  /  ALT  11  /  AlkPhos  60  03-25        PT/INR - ( 25 Mar 2024 16:10 )   PT: 14.30 sec;   INR: 1.25 ratio         PTT - ( 25 Mar 2024 16:10 )  PTT:35.9 sec  Urinalysis Basic - ( 25 Mar 2024 16:10 )    Color: x / Appearance: x / SG: x / pH: x  Gluc: 93 mg/dL / Ketone: x  / Bili: x / Urobili: x   Blood: x / Protein: x / Nitrite: x   Leuk Esterase: x / RBC: x / WBC x   Sq Epi: x / Non Sq Epi: x / Bacteria: x      Head CT  < from: CT Head No Cont (03.25.24 @ 16:08) >  Suggestion offocal gray-white distinction loss in the left parietal lobe   potentially reflecting acute or subacute infarct. An MRI of the brain is   recommended for further evaluation.    Rounded soft tissue nodule there is noted in the region of the right MCA   bifurcation potentially reflecting aneurysm. CTA of the brain is   recommended for further evaluation.      < end of copied text >   Patient is a 89y old  Female who presents with a chief complaint of     HPI:  89F right-handed Cantonese speaking w/ PMH of HTN, Afib, Pacemaker (placed in 2018), L eye cataract, chronic L knee pain who presents with dizziness going on for one week. She states that the dizziness does not feel like room spinning but just feels dizzy worse when she stands up. The symptoms started one week ago and initially she felt a headache that felt like pressure in the front of her head. She reports the symptoms feel similar to how she felt prior to having her pacemaker placed. She denies any nausea or vomiting.  CTH negative or acute pathology. 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    Head CT showed acute to subacute CVA    PAST MEDICAL & SURGICAL HISTORY:  HTN (hypertension)    Insomnia    Afib    Pacemaker  H/O cardiac pacemaker    PREVIOUS DIAGNOSTIC TESTING:      MEDICATIONS  (STANDING):  aMIOdarone   Tablet 100 milliGRAM(s) Oral daily  amLODIPine   Tablet 5 milliGRAM(s) Oral at bedtime  apixaban 2.5 milliGRAM(s) Oral every 12 hours  losartan 50 milliGRAM(s) Oral daily  magnesium oxide 400 milliGRAM(s) Oral two times a day with meals    MEDICATIONS  (PRN):  meclizine 25 milliGRAM(s) Oral every 6 hours PRN Dizziness      FAMILY HISTORY:  No pertinent family history in first degree relatives    SOCIAL HISTORY:  none  CIGARETTES:  none  ALCOHOL:  none  Past Surgical History:    Allergies:    No Known Allergies    REVIEW OF SYSTEMS:  negative    Vital Signs Last 24 Hrs  T(C): 37 (26 Mar 2024 07:55), Max: 37 (26 Mar 2024 07:55)  T(F): 98.6 (26 Mar 2024 07:55), Max: 98.6 (26 Mar 2024 07:55)  HR: 60 (26 Mar 2024 07:55) (60 - 61)  BP: 141/62 (26 Mar 2024 07:55) (123/69 - 141/65)  BP(mean): --  RR: 18 (26 Mar 2024 07:55) (18 - 18)  SpO2: 96% (26 Mar 2024 07:55) (96% - 98%)    Parameters below as of 26 Mar 2024 07:55  Patient On (Oxygen Delivery Method): room air    PHYSICAL EXAM:    GENERAL: In no apparent distress, well nourished, and hydrated.  HEART: Regular rate and rhythm; No murmurs, rubs, or gallops.  PULMONARY: Clear to auscultation and perfusion.  No rales, wheezing, or rhonchi bilaterally.  ABDOMEN: Soft, Nontender, Nondistended; Bowel sounds present  EXTREMITIES:  2+ Peripheral Pulses, No clubbing, cyanosis, or edema  NEUROLOGICAL: Grossly nonfocal    INTERPRETATION OF TELEMETRY:    ECG:  < from: 12 Lead ECG (03.25.24 @ 13:38) >  Ventricular Rate 67 BPM    Atrial Rate 67 BPM    P-R Interval 252 ms    QRS Duration 84 ms    Q-T Interval 434 ms    QTC Calculation(Bazett) 458 ms    P Axis 43 degrees    R Axis 39 degrees    T Axis -43 degrees    Diagnosis Line Atrial-paced rhythm with prolonged AV conduction  T wave abnormality, consider lateral ischemia  Abnormal ECG    < end of copied text >        LABS:                        12.0   5.11  )-----------( 210      ( 25 Mar 2024 16:10 )             36.2     03-25    142  |  108  |  27<H>  ----------------------------<  93  4.8   |  21  |  1.3    Ca    9.2      25 Mar 2024 16:10    TPro  7.2  /  Alb  4.4  /  TBili  0.7  /  DBili  x   /  AST  23  /  ALT  11  /  AlkPhos  60  03-25        PT/INR - ( 25 Mar 2024 16:10 )   PT: 14.30 sec;   INR: 1.25 ratio         PTT - ( 25 Mar 2024 16:10 )  PTT:35.9 sec  Urinalysis Basic - ( 25 Mar 2024 16:10 )    Color: x / Appearance: x / SG: x / pH: x  Gluc: 93 mg/dL / Ketone: x  / Bili: x / Urobili: x   Blood: x / Protein: x / Nitrite: x   Leuk Esterase: x / RBC: x / WBC x   Sq Epi: x / Non Sq Epi: x / Bacteria: x      Head CT  < from: CT Head No Cont (03.25.24 @ 16:08) >  Suggestion of local gray-white distinction loss in the left parietal lobe   potentially reflecting acute or subacute infarct. An MRI of the brain is   recommended for further evaluation.    Rounded soft tissue nodule there is noted in the region of the right MCA   bifurcation potentially reflecting aneurysm. CTA of the brain is   recommended for further evaluation.      < end of copied text >

## 2024-03-27 NOTE — CONSULT NOTE ADULT - ASSESSMENT
89F Cantonese-speaking w/h/o Afib (on Eliquis), SSS (s/p PPM 2018), and HTN presenting from EP office w/ dizziness and headache. Admitted to neurology surface after admission CTH/CTA demonstrated 3 saccular aneurysms. Cardiology now consulted for elevated troponin and abnormal EKG.    INCOMPLETE NOTE. FINAL RECOMMENDATIONS PENDING DISCUSSION WITH ATTENDING. 89F Cantonese-speaking w/h/o Afib (on Eliquis), SSS (s/p PPM 2018), and HTN presenting from EP office w/ dizziness and headache. Admitted to neurology surface after admission CTH/CTA demonstrated 3 saccular aneurysms. Cardiology now consulted for elevated troponin and abnormal EKG.    #Elevated Troponin  #Hypertension  #Atrial Fibrillation  #SSS (s/p PPM 2018)  Cardiology consulted for elevated troponin and abnormal EKG. hsTnT 26->29->28. Outpatient and admission EKG demonstrated TWI in leads II, III, aVF, V3-V6. Although new from outpatient EKG in 2023, inpatient EKG from 2018 shows similar TWI. Additionally, hsTnT trend w/ delta <5 rules out obstructive CAD.  - obtain TTE as per EP recs  - no need for further inpatient ischemic evaluation 89F Cantonese-speaking w/h/o Afib (on Eliquis), SSS (s/p PPM 2018), and HTN presenting from EP office w/ dizziness and headache. Admitted to neurology surface after admission CTH/CTA demonstrated 3 saccular aneurysms. Cardiology now consulted for elevated troponin and abnormal EKG.    #Elevated Troponin  #Hypertension  #Atrial Fibrillation  #SSS (s/p PPM 2018)  Cardiology consulted for elevated troponin and abnormal EKG. hsTnT 26->29->28. Outpatient and admission EKG demonstrated TWI in leads II, III, aVF, V3-V6. Although new from outpatient EKG in 2023, inpatient EKG from 2018 shows similar TWI. Additionally, hsTnT trend w/ delta <5 rules out ischemia.  - obtain TTE   - no need for further inpatient ischemic evaluation

## 2024-03-27 NOTE — PROGRESS NOTE ADULT - ATTENDING COMMENTS
89 year old woman w/ PMH of pAF (not on AC), PPM for SSS (PPM interrogated 3/25/24 w/ EP with no episodes) has been having dizziness for one week and head tightness. Sent in by EP as patient was endorsing dizziness and chest pain in the office. Patient found to have multiple incidental aneurysms.     Normal neurological exam    CTH neg  CTA h/n R. MCA bifurcation 5mm aneurysm; 3mm R. Acom aneurysm; L. carotid terminus 8mm aneurysm.   EKG T wave abnormality, consider lateral ischemia  MR brain negative    Imp:   1. Dizziness of unclear etiology -- unlikely neurological. MRI brain negative.   2. Headache of unclear etiology - unlikely sentinel bleed given characteristic and negative imaging.   3. Multiple incidental unruptured saccular aneurysms   4. Paroxysmal Afib (CHADSVASC 4; Moderate-high risk of stroke)     Plan:   Trending troponin - have stabilized  cardiology consult given dizziness workup and to discuss need for AC given pAF and CHADSVASC  NSG following (Sacho) - no role for angiogram given decision to manage medically    PT     50 minutes spent on total encounter. The necessity of the time spent during the encounter on this date of service was due to:     Review of imaging and chart; obtaining history; examination of pt; discussion and coordination of care, and discussion of lifestyle modification and risk factor control.

## 2024-03-27 NOTE — PROGRESS NOTE ADULT - SUBJECTIVE AND OBJECTIVE BOX
Neuroendovascular Progress Note:     HPI:  Patient is a 89 year old female, right handed, cantonese speaking, history of HTN AFib on eliquis 2.5mg BID, pacemaker (2018), cataracts (chronic vision loss left eye):, chronic left knee pain (per family has been hurting her for the past few months) presented with sudden onset dizziness for one week with associated frontal headache and nausea. Patient has had similar episodes in the past prior to her pacemaker insertion. 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 MRI brain negative for stroke or hemorrhage. Patient without complaints this AM. Family at bedside and on phone for translation.       Past medical history:   HTN (hypertension)  Insomnia  GERD (gastroesophageal reflux disease)  Afib  Pacemaker    Medications:   aMIOdarone    Tablet 100 milliGRAM(s) Oral daily  amLODIPine   Tablet 5 milliGRAM(s) Oral at bedtime  apixaban 5 milliGRAM(s) Oral <User Schedule>  losartan 50 milliGRAM(s) Oral daily  magnesium oxide 400 milliGRAM(s) Oral two times a day with meals      Vitals:   T(F): 98 (03-27-24 @ 08:05), Max: 98.7 (03-26-24 @ 16:26)  HR: 60 (03-27-24 @ 08:05) (60 - 78)  BP: 102/50 (03-27-24 @ 08:05) (102/50 - 143/65)  RR: 18 (03-27-24 @ 08:05) (18 - 18)  SpO2: 98% (03-27-24 @ 08:05) (96% - 100%)    Labs:                         12.0   5.11  )-----------( 210      ( 25 Mar 2024 16:10 )             36.2     03-25    142  |  108  |  27<H>  ----------------------------<  93  4.8   |  21  |  1.3    Ca    9.2      25 Mar 2024 16:10    TPro  7.2  /  Alb  4.4  /  TBili  0.7  /  DBili  x   /  AST  23  /  ALT  11  /  AlkPhos  60  03-25    PT/INR - ( 25 Mar 2024 16:10 )   PT: 14.30 sec;   INR: 1.25 ratio         PTT - ( 25 Mar 2024 16:10 )  PTT:35.9 sec  LIVER FUNCTIONS - ( 25 Mar 2024 16:10 )  Alb: 4.4 g/dL / Pro: 7.2 g/dL / ALK PHOS: 60 U/L / ALT: 11 U/L / AST: 23 U/L / GGT: x             Exam:   General - NAD   Neuro - AAo3, follows commands, EOMi, 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    Radiology:   Imaging reviewed per neuroendovascular ACP/attending.   < from: MR Head No Cont (03.26.24 @ 16:04) >  IMPRESSION:  No acute intracranial pathology.  < end of copied text >      Assessment:   Patient is a 89 year old female, right handed, cantonese speaking, history of HTN AFib on eliquis 2.5mg BID, pacemaker (2018), cataracts (chronic vision loss left eye):, chronic left knee pain (per family has been hurting her for the past few months) presented with sudden onset dizziness for one week with associated frontal headache and nausea. Patient has had similar episodes in the past prior to her pacemaker insertion. 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 MRI brain negative for stroke or hemorrhage. Patient without complaints this AM. Family at bedside and on phone for translation.     Suggestions:  - No plans for cerebral angiogram on this admission per Dr Rothman given negative MRI   - Will follow outpatient for aneurysm monitoring in the next 6 months for repeat CTA at that time   - BP < 140 /80   - Cont. Eliquis per EP   - Outpatient office aware   Discussed with Dr Rothman and neuro team.   [id number] neuroendovascular

## 2024-03-27 NOTE — CONSULT NOTE ADULT - ATTENDING COMMENTS
Briefly, 89 year old woman for whom cardiology is consulted for elevated troponin. Troponin is stable, with delta less than 5, ruling out ACS. Check echocardiogram. No further inpatient CV workup. Cardiology will sign off.

## 2024-03-27 NOTE — PROGRESS NOTE ADULT - ASSESSMENT
89F right-handed Cantonese speaking w/ PMH of HTN, Afib, Pacemaker (placed in 2018), L eye cataract, chronic L knee pain who presents with dizziness going on for one week. CTH negative or acute pathology. 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 Currently admitted to neurovascular for monitoring and  DSA.      #Saccular aneurysms arising from the left carotid terminus, right anterior communicating artery as well as the right MCA bifurcation, measuring up to 8mm.  - Q 8 neuro checks  - Brain MRI negative for acute infarct  - Dr. Rothman reviewed the case and no plan for DSA at this time given patient's age  - BP < 140/80 for aneurysm precautions     #Dizziness  - MRI negative indicating no central neurological cause of dizziness  - consider cardiac causes for dizziness  - f/u orthostatic vitals  - TTE    #HTN  #Afib   #S/p PPM placement for tachy-haley syndrome  - Goal SBP <140  - On Telmisartan 40mg OD, will start losartan 50 mg while inpatient  - C/w Amiodarone 100mg  daily  - C/w Amlodipine 5 mg daily  - C/w Eliquis 5 mg BID  - EP followup as outpatient  - Trend Troponin  - cardio recommended TTE    Diet: DASH  DVT ppx: on eliquis 89F right-handed Cantonese speaking w/ PMH of HTN, Afib, Pacemaker (placed in 2018), L eye cataract, chronic L knee pain who presents with dizziness going on for one week. CTH negative or acute pathology. 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 Currently admitted to neurovascular for monitoring and  DSA.      #Saccular aneurysms arising from the left carotid terminus, right anterior communicating artery as well as the right MCA bifurcation, measuring up to 8mm.  - Q 8 neuro checks  - Brain MRI negative for acute infarct  - Dr. Rothman reviewed the case and no plan for DSA at this time given patient's age  - BP < 140/80 for aneurysm precautions     #Dizziness  - MRI negative indicating no central neurological cause of dizziness  - consider cardiac causes for dizziness  - f/u orthostatic vitals  - TTE pending    #HTN  #Afib   #S/p PPM placement for tachy-haley syndrome  - Goal SBP <140  - On Telmisartan 40mg OD, will start losartan 50 mg while inpatient  - C/w Amiodarone 100mg  daily  - C/w Amlodipine 5 mg daily  - C/w Eliquis 5 mg BID  - EP followup as outpatient  - Trend Troponin  - cardio recommended TTE    Diet: DASH  DVT ppx: on eliquis

## 2024-03-27 NOTE — PROGRESS NOTE ADULT - SUBJECTIVE AND OBJECTIVE BOX
Neurology Progress Note    Interval History:    Patient was seen and examined, no acute event over night.     Medications:  aMIOdarone    Tablet 100 milliGRAM(s) Oral daily  amLODIPine   Tablet 5 milliGRAM(s) Oral at bedtime  apixaban 5 milliGRAM(s) Oral <User Schedule>  losartan 50 milliGRAM(s) Oral daily  magnesium oxide 400 milliGRAM(s) Oral two times a day with meals  meclizine 25 milliGRAM(s) Oral every 6 hours PRN      Vital Signs Last 24 Hrs  T(C): 36.7 (27 Mar 2024 08:05), Max: 37.1 (26 Mar 2024 16:26)  T(F): 98 (27 Mar 2024 08:05), Max: 98.7 (26 Mar 2024 16:26)  HR: 60 (27 Mar 2024 08:05) (60 - 78)  BP: 102/50 (27 Mar 2024 08:05) (102/50 - 143/65)  BP(mean): --  RR: 18 (27 Mar 2024 08:05) (18 - 18)  SpO2: 98% (27 Mar 2024 08:05) (96% - 100%)    Parameters below as of 27 Mar 2024 08:05  Patient On (Oxygen Delivery Method): room air        Neurological Examination:  General:  Appearance is consistent with chronologic age.   Cognitive/Language:  Awake, alert, and oriented to person, place, time and date.  Recent and remote memory intact.  Fund of knowledge is appropriate.  Naming, repetition and comprehension intact. Nondysarthric.    Cranial Nerves  - Eyes: L eye chronic blindness. EOMI w/o nystagmus, skew or reported double vision.  PERRL.  No ptosis/weakness of eyelid closure.    - Face:  Facial sensation normal V1 - 3, no facial asymmetry.    - Ears/Nose/Throat:  Hearing grossly intact b/l to finger rub.  Palate elevates midline.  Tongue and uvula midline.   Motor exam: Normal tone and bulk. No tenderness, twitching, tremors or involuntary movements.            Upper extremity                  Bicep     Tricep     HG                                                 R      5/5        5/5          5/5                                                    L       5/5 5/5          5/5              Lower extremity                   HF        KE        DF         PF                                                  R     5/5 5/5 5/5       5/5                                               L      3/5      3/5 5/5        5/5    Sensory examination:  Intact to light touch and pinprick, pain, temperature and proprioception and vibration in all extremities.  Reflexes: 2+ b/l biceps, triceps, patella and achilles.   Cerebellum: FTN intact.  No dysmetria.        Labs:  CBC Full  -  ( 25 Mar 2024 16:10 )  WBC Count : 5.11 K/uL  RBC Count : 4.32 M/uL  Hemoglobin : 12.0 g/dL  Hematocrit : 36.2 %  Platelet Count - Automated : 210 K/uL  Mean Cell Volume : 83.8 fL  Mean Cell Hemoglobin : 27.8 pg  Mean Cell Hemoglobin Concentration : 33.1 g/dL  Auto Neutrophil # : 3.47 K/uL  Auto Lymphocyte # : 0.95 K/uL  Auto Monocyte # : 0.54 K/uL  Auto Eosinophil # : 0.10 K/uL  Auto Basophil # : 0.03 K/uL  Auto Neutrophil % : 67.8 %  Auto Lymphocyte % : 18.6 %  Auto Monocyte % : 10.6 %  Auto Eosinophil % : 2.0 %  Auto Basophil % : 0.6 %    03-25    142  |  108  |  27<H>  ----------------------------<  93  4.8   |  21  |  1.3    Ca    9.2      25 Mar 2024 16:10    TPro  7.2  /  Alb  4.4  /  TBili  0.7  /  DBili  x   /  AST  23  /  ALT  11  /  AlkPhos  60  03-25    LIVER FUNCTIONS - ( 25 Mar 2024 16:10 )  Alb: 4.4 g/dL / Pro: 7.2 g/dL / ALK PHOS: 60 U/L / ALT: 11 U/L / AST: 23 U/L / GGT: x           PT/INR - ( 25 Mar 2024 16:10 )   PT: 14.30 sec;   INR: 1.25 ratio         PTT - ( 25 Mar 2024 16:10 )  PTT:35.9 sec  Urinalysis Basic - ( 25 Mar 2024 16:10 )    Color: x / Appearance: x / SG: x / pH: x  Gluc: 93 mg/dL / Ketone: x  / Bili: x / Urobili: x   Blood: x / Protein: x / Nitrite: x   Leuk Esterase: x / RBC: x / WBC x   Sq Epi: x / Non Sq Epi: x / Bacteria: x        RADIOLOGY & ADDITIONAL TESTS:

## 2024-03-27 NOTE — CONSULT NOTE ADULT - SUBJECTIVE AND OBJECTIVE BOX
Number ([facility] [employer]): [id number]    HPI  Cantonese  offered, family at the bedside prefers to provide translation instead.     HPI:  89F right-handed Cantonese speaking w/ PMH of HTN, Afib, Pacemaker (placed in 2018), L eye cataract, chronic L knee pain who presents with dizziness going on for one week. She states that the dizziness does not feel like room spinning but just feels dizzy worse when she stands up. The symptoms started one week ago and initially she felt a headache that felt like pressure in the front of her head. She reports the symptoms feel similar to how she felt prior to having her pacemaker placed. She denies any nausea or vomiting.  CTH negative or acute pathology. 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    CARDIOLOGY CONSULT  Cardiology consulted for elevated troponin. hsTnT 26->29->28. EKG demonstrated a-paced rhythm w/ prolonged AV conduction and TWI in leads II, III, avF, V3-V6. Of note, pt sent in by Dr Solis (EP) during evaluation in office. During evaluation, pt reported belching, front headache, and lightheadedness. Additionally, EKG in office demonstrated findings similar to those on admission EKG. However, TWI are new from prior EKG, thereby prompting ED evaluation given other symptoms. No reported CP, palpitations, SOB, or LE swelling. No known prior h/o cardiac disease or ischemic evaluation. No known family history of cardiac disease or SCD.     PAST MEDICAL & SURGICAL HISTORY  HTN (hypertension)  Insomnia  Afib  Pacemaker  H/O cardiac pacemaker    FAMILY HISTORY  No pertinent family history in first degree relatives    SOCIAL HISTORY  No reported tobacco, alcohol, or illicit/recreational drug use    ALLERGIES  No Known Allergies    MEDICATIONS:  MEDICATIONS  (STANDING):  aMIOdarone    Tablet 100 milliGRAM(s) Oral daily  amLODIPine   Tablet 5 milliGRAM(s) Oral at bedtime  apixaban 5 milliGRAM(s) Oral <User Schedule>  losartan 50 milliGRAM(s) Oral daily  magnesium oxide 400 milliGRAM(s) Oral two times a day with meals    MEDICATIONS  (PRN):  meclizine 25 milliGRAM(s) Oral every 6 hours PRN Dizziness    HOME MEDICATIONS  amLODIPine 5 mg oral tablet: 1 tab(s) orally once a day (at bedtime) (06 May 2018 05:29)  Melatonin 5 mg oral tablet: 1 tab(s) orally once a day (at bedtime) (06 May 2018 05:29)  telmisartan 40 mg oral tablet: 1 tab(s) orally once a day (26 Mar 2024 14:24)    VITALS   T(F): 98 (03-27 @ 08:05), Max: 98.7 (03-26 @ 16:26)  HR: 60 (03-27 @ 08:05) (60 - 83)  BP: 102/50 (03-27 @ 08:05) (102/50 - 143/65)  RR: 18 (03-27 @ 08:05) (18 - 18)  SpO2: 98% (03-27 @ 08:05) (96% - 100%)    REVIEW OF SYSTEMS  CONSTITUTIONAL: No weakness, fevers or chills  EYES: No visual changes  ENT: No vertigo or throat pain   NECK: No pain or stiffness  RESPIRATORY: No cough, wheezing, hemoptysis; No shortness of breath  CARDIOVASCULAR: No chest pain or palpitations  GASTROINTESTINAL: No abdominal or epigastric pain. No nausea, vomiting, or hematemesis; No diarrhea or constipation. No melena or hematochezia.  GENITOURINARY: No dysuria, frequency or hematuria  NEUROLOGICAL: No numbness or weakness  SKIN: No itching, no rashes  MSK: No pain    PHYSICAL EXAM  NEURO: patient is awake , alert and oriented  GEN: Not in acute distress  NECK: no thyroid enlargement, no JVD  LUNGS: Clear to auscultation bilaterally   CARDIOVASCULAR: S1/S2 present, RRR , no murmurs or rubs, no carotid bruits,  + PP bilaterally  ABD: Soft, non-tender, non-distended, +BS  EXT: No NICA  SKIN: Intact    LABS:             12.0   5.11  )-----------( 210      ( 25 Mar 2024 16:10 )             36.2     142  |  108  |  27<H>  ----------------------------<  93  4.8   |  21  |  1.3    Ca    9.2      25 Mar 2024 16:10    TPro  7.2  /  Alb  4.4  /  TBili  0.7  /  DBili  x   /  AST  23  /  ALT  11  /  AlkPhos  60  03-25    PT/INR - ( 25 Mar 2024 16:10 )   PT: 14.30 sec;   INR: 1.25 ratio    PTT - ( 25 Mar 2024 16:10 )  PTT:35.9 sec    Troponin T, High Sensitivity Result: 26 ng/L (03-26-24 @ 17:04)  Troponin T, High Sensitivity Result: 29 ng/L (03-26-24 @ 20:34)  Troponin T, High Sensitivity Result: 28 ng/L (03-27-24 @ 11:24)    RADIOLOGY    Transthoracic Echocardiogram (05.08.18 @ 08:19)  1. Normal global left ventricular systolic function.  2. Mild mitral annular calcification.  3. PSAP at least 38.  4. Mild aortic regurgitation.    ECG    12 Lead ECG (03.25.24 @ 13:38)  1. Atrial-paced rhythm with prolonged AV conduction  2. T wave abnormality, consider lateral ischemia  3. Abnormal ECG    TELEMETRY EVENTS: a-paced rhythm w/ prolonged AV conduction  Number ([facility] [employer]): [id number]    HPI  Cantonese  offered, family at the bedside prefers to provide translation instead.     HPI:  89F right-handed Cantonese speaking w/ PMH of HTN, Afib, Pacemaker (placed in 2018), L eye cataract, chronic L knee pain who presents with dizziness going on for one week. She states that the dizziness does not feel like room spinning but just feels dizzy worse when she stands up. The symptoms started one week ago and initially she felt a headache that felt like pressure in the front of her head. She reports the symptoms feel similar to how she felt prior to having her pacemaker placed. She denies any nausea or vomiting.  CTH negative or acute pathology. 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    CARDIOLOGY CONSULT  Cardiology consulted for elevated troponin. hsTnT 26->29->28. EKG demonstrated a-paced rhythm w/ prolonged AV conduction and TWI in leads II, III, avF, V3-V6. Of note, pt sent in by Dr Solis (EP) during evaluation in office. During evaluation, pt reported belching, front headache, and lightheadedness. Additionally, EKG in office demonstrated findings similar to those on admission EKG. However, TWI are new from prior EKG, thereby prompting ED evaluation given other symptoms. No reported CP, palpitations, SOB, or LE swelling. No known prior h/o cardiac disease or ischemic evaluation. No known family history of cardiac disease or SCD.     PAST MEDICAL & SURGICAL HISTORY  HTN (hypertension)  Insomnia  Afib  Pacemaker  H/O cardiac pacemaker    FAMILY HISTORY  No pertinent family history in first degree relatives    SOCIAL HISTORY  No reported tobacco, alcohol, or illicit/recreational drug use    ALLERGIES  No Known Allergies    MEDICATIONS:  MEDICATIONS  (STANDING):  aMIOdarone    Tablet 100 milliGRAM(s) Oral daily  amLODIPine   Tablet 5 milliGRAM(s) Oral at bedtime  apixaban 5 milliGRAM(s) Oral <User Schedule>  losartan 50 milliGRAM(s) Oral daily  magnesium oxide 400 milliGRAM(s) Oral two times a day with meals    MEDICATIONS  (PRN):  meclizine 25 milliGRAM(s) Oral every 6 hours PRN Dizziness    HOME MEDICATIONS  amLODIPine 5 mg oral tablet: 1 tab(s) orally once a day (at bedtime) (06 May 2018 05:29)  Melatonin 5 mg oral tablet: 1 tab(s) orally once a day (at bedtime) (06 May 2018 05:29)  telmisartan 40 mg oral tablet: 1 tab(s) orally once a day (26 Mar 2024 14:24)    VITALS   T(F): 98 (03-27 @ 08:05), Max: 98.7 (03-26 @ 16:26)  HR: 60 (03-27 @ 08:05) (60 - 83)  BP: 102/50 (03-27 @ 08:05) (102/50 - 143/65)  RR: 18 (03-27 @ 08:05) (18 - 18)  SpO2: 98% (03-27 @ 08:05) (96% - 100%)    REVIEW OF SYSTEMS  CONSTITUTIONAL: No weakness, fevers or chills  EYES: No visual changes  ENT: No vertigo or throat pain   NECK: No pain or stiffness  RESPIRATORY: No cough, wheezing, hemoptysis; No shortness of breath  CARDIOVASCULAR: No chest pain or palpitations  GASTROINTESTINAL: No abdominal or epigastric pain. No nausea, vomiting, or hematemesis; No diarrhea or constipation. No melena or hematochezia.  GENITOURINARY: No dysuria, frequency or hematuria  NEUROLOGICAL: No numbness or weakness  SKIN: No itching, no rashes  MSK: No pain  10 point ROS completed; negative except as stated in HPI    PHYSICAL EXAM  NEURO: patient is awake , alert and oriented  GEN: Not in acute distress, pleasant  NECK: no thyroid enlargement, no JVD  LUNGS: Clear to auscultation bilaterally, no crackles  CARDIOVASCULAR: S1/S2 present, RRR , no murmurs or rubs, no carotid bruits,  + PP bilaterally  ABD: Soft, non-tender, non-distended, +BS  EXT: No NICA, warm  SKIN: Intact, no rash    LABS:             12.0   5.11  )-----------( 210      ( 25 Mar 2024 16:10 )             36.2     142  |  108  |  27<H>  ----------------------------<  93  4.8   |  21  |  1.3    Ca    9.2      25 Mar 2024 16:10    TPro  7.2  /  Alb  4.4  /  TBili  0.7  /  DBili  x   /  AST  23  /  ALT  11  /  AlkPhos  60  03-25    PT/INR - ( 25 Mar 2024 16:10 )   PT: 14.30 sec;   INR: 1.25 ratio    PTT - ( 25 Mar 2024 16:10 )  PTT:35.9 sec    Troponin T, High Sensitivity Result: 26 ng/L (03-26-24 @ 17:04)  Troponin T, High Sensitivity Result: 29 ng/L (03-26-24 @ 20:34)  Troponin T, High Sensitivity Result: 28 ng/L (03-27-24 @ 11:24)    RADIOLOGY    Transthoracic Echocardiogram (05.08.18 @ 08:19)  1. Normal global left ventricular systolic function.  2. Mild mitral annular calcification.  3. PSAP at least 38.  4. Mild aortic regurgitation.    ECG    12 Lead ECG (03.25.24 @ 13:38)  1. Atrial-paced rhythm with prolonged AV conduction  2. T wave abnormality, consider lateral ischemia  3. Abnormal ECG    TELEMETRY EVENTS: a-paced rhythm w/ prolonged AV conduction

## 2024-03-28 NOTE — PHYSICAL THERAPY INITIAL EVALUATION ADULT - ADDITIONAL COMMENTS
Pt lives with grandson in pvt home, has steps but reports pt requires assistance 2* to hx of L knee pain . Pt was using st. cane, has rollator home that pt prefers to sit on 2* to pain . Pt lives with grandson in pvt home, has steps but reports pt requires assistance 2* to hx of L knee pain . Pt was using st. cane, has rollator home that pt prefers to sit on 2* to pain . Family reports limited ambulation at home .

## 2024-03-28 NOTE — DISCHARGE NOTE PROVIDER - PROVIDER TOKENS
PROVIDER:[TOKEN:[[id number]],FOLLOWUP:[1 month]],PROVIDER:[TOKEN:[[id number]],FOLLOWUP:[1 month]]

## 2024-03-28 NOTE — PATIENT PROFILE ADULT - FALL HARM RISK - HARM RISK INTERVENTIONS

## 2024-03-28 NOTE — CONSULT NOTE ADULT - CONSULT REASON
Forehead pressure
dizziness, aneurysms, medical mgmt
Intracranial aneurysm / dizziness
elevated troponin
dizziness

## 2024-03-28 NOTE — CONSULT NOTE ADULT - SUBJECTIVE AND OBJECTIVE BOX
Translated by Aubree  using [employer]  service    CC: Patient is a 89y old  Female who presents with a chief complaint of   HPI:  Cantonese  offered, family at the bedside prefers to provide translation instead.     HPI:  89F right-handed Cantonese speaking w/ PMH of HTN, Afib, Pacemaker (placed in 2018), L eye cataract, chronic L knee pain who presents with dizziness going on for one week. She states that the dizziness does not feel like room spinning but just feels dizzy worse when she stands up. The symptoms started one week ago and initially she felt a headache that felt like pressure in the front of her head. She reports the symptoms feel similar to how she felt prior to having her pacemaker placed. She denies any nausea or vomiting.  CTH negative or acute pathology. 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      S: Patient was examined and seen at bedside. This morning pt is resting comfortably in bed and reports no new issues or overnight events. No complaints, feels better  Denies CP, SOB, N/V/D/C/AP, cough, F, chills, dizziness, new focal weakness, HA, vision changes, dysuria, or urinary symptoms, blood in stool.  ROS: all other systems reviewed and are negative    PAST MEDICAL & SURGICAL HISTORY:  HTN (hypertension)      Insomnia      Afib      Pacemaker      H/O cardiac pacemaker        SOCIAL HISTORY:  Tobacco: negative  Illicit drugs: negative  Alcohol: social  Family history reviewed and otherwise non-contributory No clotting disorders, CVAs at early age.  ALLERGIES: NKDA    MEDICATIONS  (STANDING):  aMIOdarone    Tablet 100 milliGRAM(s) Oral daily  amLODIPine   Tablet 5 milliGRAM(s) Oral at bedtime  apixaban 5 milliGRAM(s) Oral <User Schedule>  losartan 50 milliGRAM(s) Oral daily  magnesium oxide 400 milliGRAM(s) Oral two times a day with meals    MEDICATIONS  (PRN):  meclizine 25 milliGRAM(s) Oral every 6 hours PRN Dizziness    Home Medications:  amLODIPine 5 mg oral tablet: 1 tab(s) orally once a day (at bedtime) (06 May 2018 05:29)  Melatonin 5 mg oral tablet: 1 tab(s) orally once a day (at bedtime) (06 May 2018 05:29)  telmisartan 40 mg oral tablet: 1 tab(s) orally once a day (26 Mar 2024 14:24)          Vital Signs Last 24 Hrs  T(C): 36.7 (28 Mar 2024 14:01), Max: 36.7 (28 Mar 2024 14:01)  T(F): 98.1 (28 Mar 2024 14:01), Max: 98.1 (28 Mar 2024 14:01)  HR: 66 (28 Mar 2024 14:01) (59 - 66)  BP: 97/56 (28 Mar 2024 14:01) (97/56 - 127/61)  BP(mean): 80 (28 Mar 2024 02:57) (80 - 87)  RR: 18 (28 Mar 2024 14:01) (15 - 18)  SpO2: 97% (28 Mar 2024 14:01) (95% - 97%)    Parameters below as of 28 Mar 2024 02:57  Patient On (Oxygen Delivery Method): room air      CAPILLARY BLOOD GLUCOSE          General: NAD. Looks stated age.  HEENT: clean oropharynx, no LAD, Lt eye blindness  Neck: trachea midline, no thyromegaly  CV: nl S1 S2; no m/r/g  Resp: decreased breath sounds at base  GI: NT/ND/S +BS  MS: no clubbing/cyanosis/edema, +pulses  Neuro: motor, sensory intact; + reflexes  Skin: no rashes, nl turgor  Psychiatric: AA0x2+ w/ fair insight and judgement    tele: SR, nonspecific changes (on my own evaluation of tele monitor)    LABS:                          EKG - paced, nonspecific changes (my read)  Chart and consultant noted personally reviewed.  Care Discussed with Consultants/Other Providers/ Housestaff [ x] YES [ ] NO   Radiology, labs, old records personally reviewed.

## 2024-03-28 NOTE — DISCHARGE NOTE PROVIDER - HOSPITAL COURSE
89F right-handed Cantonese speaking w/ PMH of HTN, Afib, Pacemaker (placed in 2018), L eye cataract, chronic L knee pain who presents with dizziness going on for one week. CTH negative or acute pathology. 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 Currently admitted to neurovascular for monitoring and  DSA. Neurosurgery evaluated patient and decided against DSA given patient's age. MRI was negative for stroke no acute pathology. TTE showed EF 62%. Cardiology evaluated the patient and no further inpatient workup indicated. Dizziness is likely ardiogenic given pt's medical hx. Dizziness is not due to a central neurological cause in this case. 89F right-handed Cantonese speaking w/ PMH of HTN, Afib, Pacemaker (placed in 2018), L eye cataract, chronic L knee pain who presents with dizziness going on for one week. CTH negative or acute pathology. 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 Currently admitted to neurovascular for monitoring and  DSA. Neurosurgery evaluated patient and decided against DSA given patient's age. MRI was negative for stroke no acute pathology. TTE showed EF 59%. Cardiology evaluated the patient and no further inpatient workup indicated. Dizziness is likely ardiogenic given pt's medical hx. Dizziness is not due to a central neurological cause in this case.     Diagnosis:  L carotid aneurysm    Workup  [x] HCT: age indeterminate L parietal infarct  [x] CTA head/Neck: L 3 sacular carotid terminus aneurysm  [x] MRI brain w/out: NO STROKE  [x] TTE: EF of 59 %.    Discharge exam    General:  Appearance is consistent with chronologic age.   Cognitive/Language:  Awake, alert, and oriented to person, place, time and date.  Recent and remote memory intact.  Fund of knowledge is appropriate.  Naming, repetition and comprehension intact. Nondysarthric.    Cranial Nerves  - Eyes: L eye chronic blindness. EOMI w/o nystagmus, skew or reported double vision.  PERRL.  No ptosis/weakness of eyelid closure.    - Face:  Facial sensation normal V1 - 3, no facial asymmetry.    - Ears/Nose/Throat:  Hearing grossly intact b/l to finger rub.  Palate elevates midline.  Tongue and uvula midline.   Motor exam: Normal tone and bulk. No tenderness, twitching, tremors or involuntary movements.            Upper extremity                  Bicep     Tricep     HG                                                 R      5/5        5/5          5/5                                                    L       5/5        5/5          5/5              Lower extremity                   HF        KE        DF         PF                                                  R     5/5       5/5       5/5       5/5                                               L      3/5      3/5       5/5        5/5    Sensory examination:  Intact to light touch and pinprick, pain, temperature and proprioception and vibration in all extremities.  Reflexes: 2+ b/l biceps, triceps, patella and achilles.   Cerebellum: FTN intact.  No dysmetria.     Discharge medication changes  Eliquis 5 mg twice a day    outpatient follow up with neuroendovascular and cardiology and PMD

## 2024-03-28 NOTE — DISCHARGE NOTE PROVIDER - NSDCCPCAREPLAN_GEN_ALL_CORE_FT
PRINCIPAL DISCHARGE DIAGNOSIS  Diagnosis: Brain aneurysm  Assessment and Plan of Treatment: A brain (cerebral) aneurysm is a bulging, weak area in the wall of  an artery that supplies blood to the brain. In most cases, a brain  aneurysm causes no symptoms and goes unnoticed. In rare  cases, the brain aneurysm can burst, releasing blood into the  skull and causing a stroke.  When a brain aneurysm ruptures, the result is called a  subarachnoid hemorrhage. Depending on the severity of the  hemorrhage, brain damage or death may result.  Your Brain MRI was normal so you did not have any bleeding or stroke.   You need to continue to follow up with neuroendovascular outpatient to monitor your aneurysm  Please return to the hosiptal if you experience sudden onset severe headache (worst headache of your life), suddent onset visual loss, sudden onset slurred speech or Right or left sided weakness.      SECONDARY DISCHARGE DIAGNOSES  Diagnosis: Dizziness  Assessment and Plan of Treatment: Your dizziness has resolved.   continue meclizine as needed for dizziness.  your MRI was negative for stroke so there is no neurological cause for your dizziness.   Your dizziness could be due to a cardiogenic cause or disurption of your inner ear crystals.   Please follow up with your primary care doctor and your cardiologist for these symptoms

## 2024-03-28 NOTE — PHYSICAL THERAPY INITIAL EVALUATION ADULT - PERTINENT HX OF CURRENT PROBLEM, REHAB EVAL
Pt is a 89F right-handed Cantonese speaking w/ PMH of HTN, Afib, Pacemaker (placed in 2018), L eye cataract, chronic L knee pain who presents with dizziness going on for one week. She states that the dizziness does not feel like room spinning but just feels dizzy worse when she stands up. The symptoms started one week ago and initially she felt a headache that felt like pressure in the front of her head. She reports the symptoms feel similar to how she felt prior to having her pacemaker placed. She denies any nausea or vomiting.  CTH negative or acute pathology. 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

## 2024-03-28 NOTE — PHYSICAL THERAPY INITIAL EVALUATION ADULT - SPECIFY REASON(S)
Chart reviewed. Pt. currently without activity orders. PT evaluation on hold pending activity orders as appropriate. Will follow.
Pt is being discharged.

## 2024-03-28 NOTE — DISCHARGE NOTE PROVIDER - NSDCFUSCHEDAPPT_GEN_ALL_CORE_FT
[facility] Physician On license of [facility]  CARDIOLOGY [address] [facility]  Scheduled Appointment: 05/29/2024

## 2024-03-28 NOTE — DISCHARGE NOTE PROVIDER - ATTENDING DISCHARGE PHYSICAL EXAMINATION:
MS: Oriented x3. Recent and remote recall intact. Fluent. Follows crossed commands. Patient able to verbalize past history.   CN: VFF. EOMI. V1-V3 intact. Face symmetric. T/u midline. Should shrug intact bilaterally.   Motor: Normal tone. Full strength throughout.   Sensory: Intact to LT and PP throughout   Reflexes: 2+ throughout. Babinski absent bilaterally.   Coordination: No dysmetria on FNF or ataxia on HTS bilaterally   Gait: Normal

## 2024-03-28 NOTE — DISCHARGE NOTE PROVIDER - NSDCMRMEDTOKEN_GEN_ALL_CORE_FT
amiodarone 200 mg oral tablet: 1 tab(s) orally once a day  amLODIPine 5 mg oral tablet: 1 tab(s) orally once a day (at bedtime)  apixaban 2.5 mg oral tablet: 1 tab(s) orally every 12 hours  meclizine 25 mg oral tablet: 1 tab(s) orally every 6 hours, As Needed -Dizziness   Melatonin 5 mg oral tablet: 1 tab(s) orally once a day (at bedtime)  telmisartan 40 mg oral tablet: 1 tab(s) orally once a day   amiodarone 200 mg oral tablet: 1 tab(s) orally once a day  amLODIPine 5 mg oral tablet: 1 tab(s) orally once a day (at bedtime)  apixaban 5 mg oral tablet: 1 tab(s) orally 2 times a day  meclizine 25 mg oral tablet: 1 tab(s) orally every 6 hours, As Needed -Dizziness   Melatonin 5 mg oral tablet: 1 tab(s) orally once a day (at bedtime)  telmisartan 40 mg oral tablet: 1 tab(s) orally once a day

## 2024-03-28 NOTE — PHYSICAL THERAPY INITIAL EVALUATION ADULT - PHYSICAL ASSIST/NONPHYSICAL ASSIST: GAIT, REHAB EVAL
to stay within boundaries of RW and stay close to RW to promote upright posture/ prevent fwd flexed trunk/verbal cues

## 2024-03-28 NOTE — DISCHARGE NOTE PROVIDER - CARE PROVIDER_API CALL
Joe Rothman  [address], [address]  [place] [address]  Phone: [phone]  Fax: [phone]  Follow Up Time: 1 month    Gaye Solis  Cardiovascular Disease  [address] [place] [id number]  Phone: [phone]  Fax: [phone]  Follow Up Time: 1 month

## 2024-03-28 NOTE — DISCHARGE NOTE PROVIDER - CARE PROVIDERS DIRECT ADDRESSES
[email or web address]

## 2024-03-28 NOTE — PHYSICAL THERAPY INITIAL EVALUATION ADULT - GENERAL OBSERVATIONS, REHAB EVAL
14:00-14:20 Pt encountered seated at EOB in NAD with family present to assist with Cantonese translation.

## 2024-03-28 NOTE — DISCHARGE NOTE NURSING/CASE MANAGEMENT/SOCIAL WORK - PATIENT PORTAL LINK FT
You can access the FollowMyHealth Patient Portal offered by [facility] by registering at the following website: [email or web address]. By joining [facility]’s FollowMyHealth portal, you will also be able to view your health information using other applications (apps) compatible with our system.

## 2024-03-28 NOTE — CONSULT NOTE ADULT - ASSESSMENT
89F right-handed Cantonese speaking w/ PMH of HTN, Afib, Pacemaker (placed in 2018), L eye cataract, chronic L knee pain who presents with dizziness going on for one week. CTH negative or acute pathology. 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 Currently admitted to neurovascular for monitoring and  DSA.      #Saccular aneurysms arising from the left carotid terminus, right anterior communicating artery as well as the right MCA bifurcation, measuring up to 8mm.  - Q 8 neuro checks  - Brain MRI negative for acute infarct  - Dr. Rothman reviewed the case and no plan for DSA at this time given patient's age  - BP < 140/80 for aneurysm precautions     #Dizziness  - MRI negative indicating no central neurological cause of dizziness  - f/u orthostatic vitals  - TTE pending  - Meclizine PRN    #HTN  #P Afib   #S/p PPM placement for tachy-haley syndrome  - Goal SBP <140  - On Telmisartan 40mg OD, will start losartan 50 mg while inpatient  - C/w Amiodarone 100mg  daily  - C/w Amlodipine 5 mg daily  - C/w Eliquis 5 mg BID  - EP followup as outpatient  - Trend Troponin  - cardio recommended TTE    Diet: DASH  DVT ppx: on eliquis      Attestation Statements:   Attestation Statements:  I have personally seen and examined the patient.  I fully participated in the care of this patient.  I have made amendments to the documentation where necessary, and agree with the history, physical exam, and plan as documented by the Resident.     89 year old woman w/ PMH of pAF (not on AC), PPM for SSS (PPM interrogated 3/25/24 w/ EP with no episodes) has been having dizziness for one week and head tightness. Sent in by EP as patient was endorsing dizziness and chest pain in the office. Patient found to have multiple incidental aneurysms.     Normal neurological exam    CTH neg  CTA h/n R. MCA bifurcation 5mm aneurysm; 3mm R. Acom aneurysm; L. carotid terminus 8mm aneurysm.   EKG T wave abnormality, consider lateral ischemia  MR brain negative    Imp:   1. Dizziness of unclear etiology -- unlikely neurological. MRI brain negative.   2. Headache of unclear etiology - unlikely sentinel bleed given characteristic and negative imaging.   3. Multiple incidental unruptured saccular aneurysms   4. Paroxysmal Afib (CHADSVASC 4; Moderate-high risk of stroke)     Plan:   Trending troponin - have stabilized  cardiology consult given dizziness workup and to discuss need for AC given pAF and CHADSVASC  NSG following (Lorna) - no role for angiogram given decision to manage medically    PT     50 minutes spent on total encounter. The necessity of the time spent during the encounter on this date of service was due to:     Review of imaging and chart; obtaining history; examination of pt; discussion and coordination of care, and discussion of lifestyle modification and risk factor control.   89F right-handed Cantonese speaking w/ PMH of HTN, Afib, Pacemaker (placed in 2018), L eye cataract, chronic L knee pain who presents with dizziness going on for one week. CTH negative or acute pathology. 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 Currently admitted to neurovascular for monitoring and  DSA.      #Saccular aneurysms arising from the left carotid terminus, right anterior communicating artery as well as the right MCA bifurcation, measuring up to 8mm.  - Q 8 neuro checks  - Brain MRI negative for acute infarct  - Dr. Rothman reviewed the case and no plan for DSA at this time given patient's age  - BP < 140/80 for aneurysm precautions     #Dizziness  - MRI negative indicating no central neurological cause of dizziness  - f/u orthostatic vitals  - Meclizine PRN    #HTN  #h/o ?PAfib   #S/p PPM placement for tachy-haley syndrome  #Chest discomfort  - Goal SBP <140  - On Telmisartan 40mg OD, will start losartan 50 mg while inpatient  - C/w Amiodarone 100mg  daily  - C/w Amlodipine 5 mg daily  - C/w Eliquis 5 mg BID  - EP followup as outpatient  - Trend Troponin  -f/u w/ cardio    #Nutrition/Fluids/Electrolytes   - replete K<4 and Mg <2  - ensure regular BMs  - consider Miralax BID, Senna    #DVT Px  SCDs  Eliquis    Chart and notes personally reviewed.  Care Discussed with Consultants/Other Providers/ Housestaff [ x] YES [ ] NO   Radiology, labs, old records personally reviewed.    discussed w/ housestaff, nursing, case management, neuro team, daughter    Time-based billing (NON-critical care).     50 minutes spent on total encounter. The necessity of the time spent during the encounter on this date of service was due to:     time spent on review of labs, imaging studies, old records, obtaining history, personally examining patient, counselling and communicating with patient/ family, entering orders for medications/tests/etc, discussions with other health care providers, documentation in electronic health records, independent interpretation of labs, imaging/procedure results and care coordination.

## 2024-03-28 NOTE — DISCHARGE NOTE PROVIDER - NSDCFUADDINST_GEN_ALL_CORE_FT
Please note we have increased your home eliquis dose to 5 mg twice a day once in the morning and once in the evening.

## 2024-04-25 NOTE — ED ADULT TRIAGE NOTE - CHIEF COMPLAINT QUOTE
Pt was brought for cardiac arrest, downtime 1.5 hours ago, EMS arrived on the scene at 19:30, was found in Asystole, went to pulseless V-tach, then sinus with ROSC, V-tach again, sinus once again. Epi x 5, defibrillation x 2 PTA, and Amiodarone 300 mg all was given PTA. Pt arrived to ED intubated with sinus rhythm.

## 2024-04-25 NOTE — ED PROVIDER NOTE - CLINICAL SUMMARY MEDICAL DECISION MAKING FREE TEXT BOX
Patient found to have large subarachnoid.  Intubated by EMS.  Arterial line and central line started.  Blood pressure stable.  Patient admitted to neuro ICU

## 2024-04-25 NOTE — ED PROVIDER NOTE - PHYSICAL EXAMINATION
VITAL SIGNS: I have reviewed nursing notes and confirm.  CONSTITUTIONAL: Patient in active cardiac arrest.   SKIN: Skin exam is cold and pale.   HEAD: Normocephalic; atraumatic.  EYES: Cataracts bilaterally. Unable to assess pupil reactivity.   NECK: Supple; non tender.  CARD: Active cardiac arrest.   RESP: Intubated upon arrival; confirmed with glidescope.   ABD: Soft; non-distended  EXT: Pulseless upon first assessment.   NEURO: Intubated, unresponsive upon arrival.

## 2024-04-25 NOTE — CONSULT NOTE ADULT - SUBJECTIVE AND OBJECTIVE BOX
Neurosurgery  Consult    Patient is a 89y old  Female who presents with a chief complaint of cardiac arrest    HPI:: Patient is a 89y Female PMH HTN, Afib on Eliquis, Pacemaker placement in 2018, CTA Head March 2024 revealing three saccular aneurysms, who presents to the ED BIBEMS s/p cardiac arrest. Family reports patient went to sleep and then was found unresponsive 30 mins later after LKW. EMS arrived at the scene at 19:30. Patient was found to be in asystole, CPR was initiated and then found to be in pulseless V-tach. Patient was defibrillated x2 and given 5 rounds of Epi and a dose of Amio 300mg; ROSC was achieved twice in the field. Patient arrived in ED intubated and arrested in trauma bay. ACLS initiated by ED provider team. ET tube placement confirmed via glidescope. IV access obtained. ROSC was achieved. Post-arrest care initiated.      Interval hx: Pt currently on Epi and levo; with HR in 180's. HCT shows Extensive SAH with lost of grey /white differentiation.          PAST MEDICAL & SURGICAL HISTORY:  HTN (hypertension)  Insomnia  Afib  Pacemaker  H/O cardiac pacemaker          FAMILY HISTORY:  No pertinent family history in first degree relatives        Social History: (-) x 3    Allergies    No Known Allergies    Intolerances        MEDICATIONS  (STANDING):  EPINEPHrine    Infusion 0.1 MICROgram(s)/kG/Min (30 mL/Hr) IV Continuous <Continuous>  fentaNYL   Infusion. 0.5 MICROgram(s)/kG/Hr (4 mL/Hr) IV Continuous <Continuous>  norepinephrine Infusion 0.05 MICROgram(s)/kG/Min (7.5 mL/Hr) IV Continuous <Continuous>    MEDICATIONS  (PRN):      Review of systems:    Constitutional: No fever, weight loss or fatigue    Eyes: No eye pain or discharge  ENMT:  No difficulty hearing; No sinus or throat pain  Neck: No pain or stiffness  Respiratory: No cough, wheezing, chills or hemoptysis  Cardiovascular: No chest pain, palpitations, shortness of breath, dyspnea on exertion  Gastrointestinal: No abdominal pain, nausea, vomiting or hematemesis; No diarrhea or constipation.   Genitourinary: No dysuria, frequency, hematuria or incontinence  Neurological: As per HPI  Skin: No rashes or lesions   Endocrine: No heat or cold intolerance; No hair loss  Musculoskeletal: No joint pain or swelling  Psychiatric: No depression, anxiety, mood swings  Heme/Lymph: No easy bruising or bleeding gums    Vital Signs Last 24 Hrs  T(C): --  T(F): --  HR: 69 (25 Apr 2024 22:30) (64 - 109)  BP: 105/45 (25 Apr 2024 21:40) (47/30 - 150/69)  BP(mean): 58 (25 Apr 2024 21:15) (35 - 99)  RR: 22 (25 Apr 2024 22:30) (22 - 22)  SpO2: 97% (25 Apr 2024 22:30) (96% - 100%)    Parameters below as of 25 Apr 2024 22:30  Patient On (Oxygen Delivery Method): ventilator    O2 Concentration (%): 100    Neurologic Examination:  General:  Appearance is consistent with chronologic age.  No abnormal facies.   Intubated - Sedation held  Pupils' 5mm fixed dilated - No corneals   No response to noxious stimuli                  Labs:   CBC Full  -  ( 25 Apr 2024 20:36 )  WBC Count : 6.41 K/uL  RBC Count : 3.53 M/uL  Hemoglobin : 10.0 g/dL  Hematocrit : 33.2 %  Platelet Count - Automated : 170 K/uL  Mean Cell Volume : 94.1 fL  Mean Cell Hemoglobin : 28.3 pg  Mean Cell Hemoglobin Concentration : 30.1 g/dL  Auto Neutrophil # : 1.75 K/uL  Auto Lymphocyte # : 2.85 K/uL  Auto Monocyte # : 0.47 K/uL  Auto Eosinophil # : 0.12 K/uL  Auto Basophil # : 0.06 K/uL  Auto Neutrophil % : 21.8 %  Auto Lymphocyte % : 44.5 %  Auto Monocyte % : 7.3 %  Auto Eosinophil % : 1.8 %  Auto Basophil % : 0.9 %    04-25    138  |  108  |  35<H>  ----------------------------<  403<H>  4.4   |  9<LL>  |  2.1<H>    Ca    19.8<HH>      25 Apr 2024 20:36  Phos  11.2     04-25  Mg     3.2     04-25    TPro  5.7<L>  /  Alb  3.5  /  TBili  0.3  /  DBili  x   /  AST  394<H>  /  ALT  390<H>  /  AlkPhos  81  04-25    LIVER FUNCTIONS - ( 25 Apr 2024 20:36 )  Alb: 3.5 g/dL / Pro: 5.7 g/dL / ALK PHOS: 81 U/L / ALT: 390 U/L / AST: 394 U/L / GGT: x           PT/INR - ( 25 Apr 2024 20:36 )   PT: 16.60 sec;   INR: 1.45 ratio         PTT - ( 25 Apr 2024 20:36 )  PTT:30.1 sec  Urinalysis Basic - ( 25 Apr 2024 20:36 )    Color: x / Appearance: x / SG: x / pH: x  Gluc: 403 mg/dL / Ketone: x  / Bili: x / Urobili: x   Blood: x / Protein: x / Nitrite: x   Leuk Esterase: x / RBC: x / WBC x   Sq Epi: x / Non Sq Epi: x / Bacteria: x          Neuroimaging:  NCHCT: CT Head No Cont:   ACC: [id number] EXAM:  CT BRAIN   ORDERED BY: GIN ARORA     PROCEDURE DATE:  04/25/2024          INTERPRETATION:  CLINICAL INDICATION: Post cardiac arrest    TECHNIQUE: CT of the head was performed without the administration of   intravenous contrast.    COMPARISON: CT head 3/25/2024    FINDINGS:    Hyperdensities are seen throughout the bilateral hemispheric sulci and   basal cisterns concerning for subarachnoid hemorrhage.    The ventricles are unremarkable in appearance.    There is no midline shift.    There is no evidence of hydrocephalus.    Patient is post right cataract surgery. The imaged portions of the   paranasal sinuses are aerated. The mastoid air cells are aerated. The   visualized soft tissues and osseous structures appear normal.    IMPRESSION:    Diffuse subarachnoid throughout the bilateral hemispheric sulci and basal   cisterns.    Communication: The summary of above findings were discussed with readback   confirmation with Dr. Arora by radiology resident Venu Marquez MD   4/25/2024 9:52 PM    --- End of Report ---          VENU MARQUEZ MD; Resident Radiologist  This document has been electronically signed.  BALBINA HESS MD; Attending Radiologist  This document has been electronically signed. Apr 25 2024 10:42PM (04-25-24 @ 21:34)        Assessment:  This is a 89y Female with h/o saccular anuerysms presents in Cardiac Arrest -> asystole-> V fib-> return to ROSC x 3; currently on levo and Epi with a GCS3 pupils fixed. HCT show extensive SAH  [id number]    Plan:  No acute intervention warranted -          ICU admission - Recommend conservative treatment maximize medical management         Poor prognosis given exam GCS 3, fixed pupils and  Anoxic injury - consider goals of care discussion    Case discussed and films reviewed with Dr Doss  -   04-25-24 @ 23:25           Neurosurgery  Consult    Patient is a 89y old  Female who presents with a chief complaint of cardiac arrest    HPI:: Patient is a 89y Female PMH HTN, Afib on Eliquis, Pacemaker placement in 2018, CTA Head March 2024 revealing three saccular aneurysms, who presents to the ED BIBEMS s/p cardiac arrest. Family reports patient went to sleep and then was found unresponsive 30 mins later after LKW. EMS arrived at the scene at 19:30. Patient was found to be in asystole, CPR was initiated and then found to be in pulseless V-tach. Patient was defibrillated x2 and given 5 rounds of Epi and a dose of Amio 300mg; ROSC was achieved twice in the field. Patient arrived in ED intubated and arrested in trauma bay. ACLS initiated by ED provider team. ET tube placement confirmed via glidescope. IV access obtained. ROSC was achieved. Post-arrest care initiated.    Pt was brought for cardiac arrest, downtime 1.5 hours ago, 1800   EMS arrived on the scene at 19:30, was found in Asystole, went to pulseless V-tach, then sinus with ROSC,   V-tach again, sinus once again. Epi x 5, defibrillation x 2 PTA, and Amiodarone 300 mg all was given PTA.   Pt arrived to ED  PEA epi--. PEA- epi _> PEA epi/Calcium ROSC 2013  Interval hx: Pt currently on Epi and levo; with HR in 80's. HCT shows Extensive SAH with lost of grey /white differentiation.          PAST MEDICAL & SURGICAL HISTORY:  HTN (hypertension)  Insomnia  Afib  Pacemaker  H/O cardiac pacemaker          FAMILY HISTORY:  No pertinent family history in first degree relatives        Social History: (-) x 3    Allergies    No Known Allergies    Intolerances        MEDICATIONS  (STANDING):  EPINEPHrine    Infusion 0.1 MICROgram(s)/kG/Min (30 mL/Hr) IV Continuous <Continuous>  fentaNYL   Infusion. 0.5 MICROgram(s)/kG/Hr (4 mL/Hr) IV Continuous <Continuous>  norepinephrine Infusion 0.05 MICROgram(s)/kG/Min (7.5 mL/Hr) IV Continuous <Continuous>    MEDICATIONS  (PRN):      Review of systems:    Constitutional: No fever, weight loss or fatigue    Eyes: No eye pain or discharge  ENMT:  No difficulty hearing; No sinus or throat pain  Neck: No pain or stiffness  Respiratory: No cough, wheezing, chills or hemoptysis  Cardiovascular: No chest pain, palpitations, shortness of breath, dyspnea on exertion  Gastrointestinal: No abdominal pain, nausea, vomiting or hematemesis; No diarrhea or constipation.   Genitourinary: No dysuria, frequency, hematuria or incontinence  Neurological: As per HPI  Skin: No rashes or lesions   Endocrine: No heat or cold intolerance; No hair loss  Musculoskeletal: No joint pain or swelling  Psychiatric: No depression, anxiety, mood swings  Heme/Lymph: No easy bruising or bleeding gums    Vital Signs Last 24 Hrs  T(C): --  T(F): --  HR: 69 (25 Apr 2024 22:30) (64 - 109)  BP: 105/45 (25 Apr 2024 21:40) (47/30 - 150/69)  BP(mean): 58 (25 Apr 2024 21:15) (35 - 99)  RR: 22 (25 Apr 2024 22:30) (22 - 22)  SpO2: 97% (25 Apr 2024 22:30) (96% - 100%)    Parameters below as of 25 Apr 2024 22:30  Patient On (Oxygen Delivery Method): ventilator    O2 Concentration (%): 100    Neurologic Examination:  General:  Appearance is consistent with chronologic age.  No abnormal facies.   Intubated - Sedation held  Pupils' 5mm fixed dilated - No corneals   No response to noxious stimuli                  Labs:   CBC Full  -  ( 25 Apr 2024 20:36 )  WBC Count : 6.41 K/uL  RBC Count : 3.53 M/uL  Hemoglobin : 10.0 g/dL  Hematocrit : 33.2 %  Platelet Count - Automated : 170 K/uL  Mean Cell Volume : 94.1 fL  Mean Cell Hemoglobin : 28.3 pg  Mean Cell Hemoglobin Concentration : 30.1 g/dL  Auto Neutrophil # : 1.75 K/uL  Auto Lymphocyte # : 2.85 K/uL  Auto Monocyte # : 0.47 K/uL  Auto Eosinophil # : 0.12 K/uL  Auto Basophil # : 0.06 K/uL  Auto Neutrophil % : 21.8 %  Auto Lymphocyte % : 44.5 %  Auto Monocyte % : 7.3 %  Auto Eosinophil % : 1.8 %  Auto Basophil % : 0.9 %    04-25    138  |  108  |  35<H>  ----------------------------<  403<H>  4.4   |  9<LL>  |  2.1<H>    Ca    19.8<HH>      25 Apr 2024 20:36  Phos  11.2     04-25  Mg     3.2     04-25    TPro  5.7<L>  /  Alb  3.5  /  TBili  0.3  /  DBili  x   /  AST  394<H>  /  ALT  390<H>  /  AlkPhos  81  04-25    LIVER FUNCTIONS - ( 25 Apr 2024 20:36 )  Alb: 3.5 g/dL / Pro: 5.7 g/dL / ALK PHOS: 81 U/L / ALT: 390 U/L / AST: 394 U/L / GGT: x           PT/INR - ( 25 Apr 2024 20:36 )   PT: 16.60 sec;   INR: 1.45 ratio        PTT - ( 25 Apr 2024 20:36 )  PTT:30.1 sec  Urinalysis Basic - ( 25 Apr 2024 20:36 )    Color: x / Appearance: x / SG: x / pH: x  Gluc: 403 mg/dL / Ketone: x  / Bili: x / Urobili: x   Blood: x / Protein: x / Nitrite: x   Leuk Esterase: x / RBC: x / WBC x   Sq Epi: x / Non Sq Epi: x / Bacteria: x          Neuroimaging:  NCHCT: CT Head No Cont:   ACC: [id number] EXAM:  CT BRAIN   ORDERED BY: GIN ARORA     PROCEDURE DATE:  04/25/2024          INTERPRETATION:  CLINICAL INDICATION: Post cardiac arrest    TECHNIQUE: CT of the head was performed without the administration of   intravenous contrast.    COMPARISON: CT head 3/25/2024    FINDINGS:    Hyperdensities are seen throughout the bilateral hemispheric sulci and   basal cisterns concerning for subarachnoid hemorrhage.    The ventricles are unremarkable in appearance.    There is no midline shift.    There is no evidence of hydrocephalus.    Patient is post right cataract surgery. The imaged portions of the   paranasal sinuses are aerated. The mastoid air cells are aerated. The   visualized soft tissues and osseous structures appear normal.    IMPRESSION:    Diffuse subarachnoid throughout the bilateral hemispheric sulci and basal   cisterns.    Communication: The summary of above findings were discussed with readback   confirmation with Dr. Arora by radiology resident Venu Marquez MD   4/25/2024 9:52 PM    --- End of Report ---    VENU MARQUEZ MD; Resident Radiologist  This document has been electronically signed.  BALBINA HESS MD; Attending Radiologist  This document has been electronically signed. Apr 25 2024 10:42PM (04-25-24 @ 21:34)        Assessment:  This is a 89y Female with h/o saccular anuerysms presents in Cardiac Arrest Found down 1.5H  -> Asystole-> V fib-> return to ROSC x 3; currently on levo and Epi with a GCS3 pupils fixed. HCT show extensive SAH with lost of grey white.    Plan:  No acute neurosurgical  intervention warranted -            Poor prognosis given Anoxic Brain injury and GCS 3 - consider goals of care discussion/ Supportive Care - Medical management    Case discussed and films reviewed with Dr Doss  -   04-25-24 @ 23:25

## 2024-04-25 NOTE — ED PROCEDURE NOTE - CPROC ED TIME OUT STATEMENT1
“Patient's name, , procedure and correct site were confirmed during the [place] Timeout.”
“Patient's name, , procedure and correct site were confirmed during the [place] Timeout.”

## 2024-04-25 NOTE — ED ADULT NURSE NOTE - OBJECTIVE STATEMENT
Pt was brought for cardiac arrest, downtime 1.5 hours ago, EMS arrived on the scene at 19:30, was found in Asystole, went to pulseless V-tach, then sinus with ROSC, V-tach again, sinus once again. Epi x 5, defibrillation x 2 PTA, and Amiodarone 300 mg all was given PTA. Pt arrived to ED intubated with sinus rhythm. ROSC achieved in ED at 20:13. IV access obtained, A-Line and R-Fem Central Line placed by ED MDs. Pt on continuous cardiac monitoring. Fall precautions in place as per hospital polity. Care ongoing.

## 2024-04-25 NOTE — PROCEDURAL SAFETY CHECKLIST WITH OR WITHOUT SEDATION - NSSIDESITEMARKD_GEN_ALL_CORE
02/04/20                            Rola Benitez  [address] [facility] [address]    To Whom It May Concern:    This is to certify Rola Benitez was evaluated with Gene Pavon DO on 02/04/20 and can return to regular work on 2/6/2020.     RESTRICTIONS: none              Gene Pavon DO  [facility] [place], [place]  [address] [facility] [address]  Phone: [phone]  Fax: [phone]    
done
done

## 2024-04-25 NOTE — ED PROVIDER NOTE - PROGRESS NOTE DETAILS
CR: Intubated confirmed with glidescope. A line placed in right radial artery. Central line placed in right femoral artery. CR: Neurosurg consulted, no reccs or further intervention given CT results and patient's current status. Neuro Crit currently evaluating patient, awaiting final reccs/dispo. CR: Cardiology Fellow evaluated patient, reccs inpatient consult once pt is admitted. CR: Neurosurg consulted, no reccs or further intervention given CT results and patient's current status. Neuro Crit currently evaluating patient, awaiting final reccs/dispo. requested cardio consult for s/p cardiacarrest now w/ ROSC. neurocrit rec admission to NICU under Dr. Mason; they will give hypertonic saline. CR: Cardiology Fellow evaluated patient, reccs inpatient consult once pt is admitted as there is no cardio intervention at this time. family aware and updated w/ readback.

## 2024-04-25 NOTE — PROCEDURAL SAFETY CHECKLIST WITH OR WITHOUT SEDATION - NSPREPROCSEDMD_GEN_ALL_CORE
----- Message from Joel Choi sent at 1/12/2018 10:33 AM CST -----  Contact: Pt  _x  1st Request  _  2nd Request  _  3rd Request        Who: MCKENNA ROMAN [[id number]]    Why: Returning a call back in regards discussing medication refill. Pt states that pharmacy is advising they do not have medication refill.  Please return the call at earliest convenience. Thanks!      What Number to Call Back:[phone] (M)    When to Expect a call back: (Within 24 hours)                              
done
done

## 2024-04-25 NOTE — ED PROVIDER NOTE - EKG/XRAY ADDITIONAL INFORMATION
independent interpretation of the ekg performed by Dr. David Joseph shows AV dual paced rhythm with QTc 525  DC interval 186 heart rate 69

## 2024-04-25 NOTE — ED ADULT NURSE NOTE - NSFALLHARMRISKINTERV_ED_ALL_ED

## 2024-04-25 NOTE — ED PROVIDER NOTE - ATTENDING CONTRIBUTION TO CARE
Patient with history of hypertension and prior history of aneurysms who is presenting after being found to be unresponsive after she was in normal state of health.  This occurred this evening.  Patient required amnio and epi with EMS was found in asystole.  Here in the ED as the patient was being wheeled in required 1 shock CPR was not continued amiodarone was already provided 1 round of CPR was completed afterwards there was ROSC.  Arterial line Central line was placed intubation was confirmed plan will be to obtain CT scan EKG shows paced rhythm but wide compared to prior EKGs

## 2024-04-25 NOTE — ED PROVIDER NOTE - OBJECTIVE STATEMENT
Patient is a 89y Female PMH HTN, Afib, Pacemaker placement in 2018, CTA Head March 2024 revealing three saccular aneurysms, who presents to the ED BIBEMS s/p cardiac arrest. Patient was found down after hour and a half. EMS arrived at the scene at 19:30. Patient was found to be in asystole, CPR was initiated and then found to be in pulseless V-tach. Patient was defibrillated x2 and given 5 rounds of Epi and a dose of Amio 300mg. Patient arrived in ED intubated and arrested in trauma bay. ACLS initiated by ED provider team. ET tube placement confirmed via glidescope. IV access obtained. ROSC was achieved. Post-arrest care initiated. Patient is a 89y Female PMH HTN, Afib, Pacemaker placement in 2018, CTA Head March 2024 revealing three saccular aneurysms, who presents to the ED BIBEMS s/p cardiac arrest. Patient was found down after hour and a half; family reports patient went to sleep and then was found unresponsive. EMS arrived at the scene at 19:30. Patient was found to be in asystole, CPR was initiated and then found to be in pulseless V-tach. Patient was defibrillated x2 and given 5 rounds of Epi and a dose of Amio 300mg; ROSC was achieved twice in the field. Patient arrived in ED intubated and arrested in trauma bay. ACLS initiated by ED provider team. ET tube placement confirmed via glidescope. IV access obtained. ROSC was achieved. Post-arrest care initiated. Patient is a 89y Female PMH HTN, Afib on Eliquis, Pacemaker placement in 2018, CTA Head March 2024 revealing three saccular aneurysms, who presents to the ED BIBEMS s/p cardiac arrest. Patient was found down after hour and a half; family reports patient went to sleep and then was found unresponsive. EMS arrived at the scene at 19:30. Patient was found to be in asystole, CPR was initiated and then found to be in pulseless V-tach. Patient was defibrillated x2 and given 5 rounds of Epi and a dose of Amio 300mg; ROSC was achieved twice in the field. Patient arrived in ED intubated and arrested in trauma bay. ACLS initiated by ED provider team. ET tube placement confirmed via glidescope. IV access obtained. ROSC was achieved. Post-arrest care initiated. Patient is a 89y Female PMH HTN, Afib on Eliquis, Pacemaker placement in 2018, CTA Head March 2024 revealing three saccular aneurysms, who presents to the ED BIBEMS s/p cardiac arrest. Family reports patient went to sleep and then was found unresponsive 30 mins later after LKW. EMS arrived at the scene at 19:30. Patient was found to be in asystole, CPR was initiated and then found to be in pulseless V-tach. Patient was defibrillated x2 and given 5 rounds of Epi and a dose of Amio 300mg; ROSC was achieved twice in the field. Patient arrived in ED intubated and arrested in trauma bay. ACLS initiated by ED provider team. ET tube placement confirmed via glidescope. IV access obtained. ROSC was achieved. Post-arrest care initiated.

## 2024-04-26 PROBLEM — I48.91 UNSPECIFIED ATRIAL FIBRILLATION: Chronic | Status: ACTIVE | Noted: 2024-01-01

## 2024-04-26 PROBLEM — Z95.0 PRESENCE OF CARDIAC PACEMAKER: Chronic | Status: ACTIVE | Noted: 2024-01-01

## 2024-04-26 NOTE — H&P ADULT - NSHPLABSRESULTS_GEN_ALL_CORE
ICU Vital Signs Last 24 Hrs  T(C): --  T(F): --  HR: 0 (26 Apr 2024 00:30) (0 - 109)  BP: 105/45 (25 Apr 2024 21:40) (47/30 - 150/69)  BP(mean): 58 (25 Apr 2024 21:15) (35 - 99)  ABP: 0/0 (26 Apr 2024 00:30) (0/0 - 121/54)  ABP(mean): 0 (26 Apr 2024 00:30) (0 - 74)  RR: 0 (26 Apr 2024 00:30) (0 - 22)  SpO2: 98% (26 Apr 2024 00:15) (96% - 100%)          LABS:  Na: 138 (04-25 @ 20:36)  K: 4.4 (04-25 @ 20:36)  Cl: 108 (04-25 @ 20:36)  CO2: 9 (04-25 @ 20:36)  BUN: 35 (04-25 @ 20:36)  Cr: 2.1 (04-25 @ 20:36)  Glu: 403(04-25 @ 20:36)    Hgb: 10.0 (04-25 @ 20:36)  Hct: 33.2 (04-25 @ 20:36)  WBC: 6.41 (04-25 @ 20:36)  Plt: 170 (04-25 @ 20:36)    INR: 1.45 04-25-24 @ 20:36  PTT: 30.1 04-25-24 @ 20:36          LIVER FUNCTIONS - ( 25 Apr 2024 20:36 )  Alb: 3.5 g/dL / Pro: 5.7 g/dL / ALK PHOS: 81 U/L / ALT: 390 U/L / AST: 394 U/L / GGT: x           ABG - ( 25 Apr 2024 23:34 )  pH, Arterial: 7.03  pH, Blood: x     /  pCO2: 31    /  pO2: 93    / HCO3: 8     / Base Excess: -21.3 /  SaO2: 97.0              Mode: AC/ CMV (Assist Control/ Continuous Mandatory Ventilation), RR (machine): 22, TV (machine): 400, FiO2: 100, PEEP: 8, ITime: 1, MAP: 13, PIP: 25    MEDICATIONS  (STANDING):  dexMEDEtomidine Infusion 0.2 MICROgram(s)/kG/Hr (4 mL/Hr) IV Continuous <Continuous>  EPINEPHrine    Infusion 0.03 MICROgram(s)/kG/Min (4.5 mL/Hr) IV Continuous <Continuous>  fentaNYL   Infusion. 0.5 MICROgram(s)/kG/Hr (4 mL/Hr) IV Continuous <Continuous>  norepinephrine Infusion 0.05 MICROgram(s)/kG/Min (7.5 mL/Hr) IV Continuous <Continuous>  vasopressin Infusion 0.02 Unit(s)/Min (3 mL/Hr) IV Continuous <Continuous>    MEDICATIONS  (PRN):

## 2024-04-26 NOTE — PROGRESS NOTE ADULT - SUBJECTIVE AND OBJECTIVE BOX
89y Female PMH HTN, Afib on Eliquis, Pacemaker placement in 2018, CTA Head March 2024 revealing three saccular aneurysms, who presents to the ED BIBEMS s/p cardiac arrest. Family reports patient went to sleep and then was found unresponsive 30 mins later after LKW. EMS arrived at the scene at 19:30. Patient was found to be in asystole, CPR was initiated and then found to be in pulseless V-tach. Patient was defibrillated x2 and given 5 rounds of Epi and a dose of Amio 300mg; ROSC was achieved twice in the field. Patient arrived in ED intubated and arrested in trauma bay. ACLS initiated by ED provider team. ET tube placement confirmed via glidescope. IV access obtained. ROSC was achieved. Post-arrest care initiated.

## 2024-04-26 NOTE — DISCHARGE NOTE FOR THE EXPIRED PATIENT - HOSPITAL COURSE
89y Female PMH HTN, Afib on Eliquis, Pacemaker placement in 2018, CTA Head March 2024 revealing three saccular aneurysms, who presents to the ED BIBEMS s/p cardiac arrest. Family reports patient went to sleep and then was found unresponsive 30 mins later after LKW. EMS arrived at the scene at 19:30. Patient was found to be in asystole, CPR was initiated and then found to be in pulseless V-tach. Patient was defibrillated x2 and given 5 rounds of Epi and a dose of Amio 300mg; ROSC was achieved twice in the field. Patient arrived in ED intubated and arrested in trauma bay. ACLS initiated by ED provider team. ET tube placement confirmed via glidescope. IV access obtained. ROSC was achieved. Post-arrest care initiated. Admitted diffuse subarachnoid throughout the bilateral hemispheric sulci and basal cisterns. Admitted to NCC for further management. 89y Female PMH HTN, Afib on Eliquis, Pacemaker placement in 2018, CTA Head March 2024 revealing three saccular aneurysms, who presents to the ED BIBEMS s/p cardiac arrest. Family reports patient went to sleep and then was found unresponsive 30 mins later after LKW. EMS arrived at the scene at 19:30. Patient was found to be in asystole, CPR was initiated and then found to be in pulseless V-tach. Patient was defibrillated x2 and given 5 rounds of Epi and a dose of Amio 300mg; ROSC was achieved twice in the field. Patient arrived in ED intubated and arrested in trauma bay. ACLS initiated by ED provider team. ET tube placement confirmed via glidescope. IV access obtained. ROSC was achieved. Post-arrest care initiated. Admitted diffuse subarachnoid throughout the bilateral hemispheric sulci and basal cisterns. Admitted to NCC for further management. Pt was given 23.4% saline, BP was augmented with Levophed, epinephrine and vasopressin was later added. Goals of care discussion was held with the family. Pt transitioned to CMO and passed on at 12:30AM of cardiopulmonary arrest. Family were at the bedside.

## 2024-04-26 NOTE — H&P ADULT - NSHPPHYSICALEXAM_GEN_ALL_CORE
Physical Exam:  General: No acute distress  HEENT: Anicteric sclerae  Cardiac: [id number]  Lungs: Clear  Abdomen: Soft, non-tender, +BS  Extremities: No c/c/e  Skin/Incision Site: Clean, dry and intact  Neurologic: intubated, obtunded, not following commands, fixed and dilated pupil, (-) corneal/cough/gag, midline gaze, no movement to noxious stimuli

## 2024-04-26 NOTE — H&P ADULT - ASSESSMENT
89y Female PMH HTN, Afib on Eliquis, Pacemaker placement in 2018, CTA Head March 2024 revealing three saccular aneurysms, who presents to the ED BIBEMS s/p cardiac arrest found to have diffuse subarachnoid throughout the bilateral hemispheric sulci and basal cisterns.       Assessment:	      Neurological:  - Neuro Checks Q1  - Hydrocephalus watch - Change in neuro exam obtain Brain CT  - Maintain CPP > 70, ICP < 20  - Keppra 500mg Q12 for seizure prophylaxis  - Nimodipine 60mg Q4 for vasospasm prevention  - Repeat CTH in am or sooner if change in mental status  - Daily TCD to trend cerebral vascular velocity for VSP  - CTA/CTP: No evidence of flow-limiting stenosis, occlusion or aneurysm.  - Analgesia: Tylenol/Fentanyl prn  - NSx c/s: s/p EVD placement  - Case discussed with NeuroIR, planned DSA tonight to r/o aneurysm  - Toxicology - pending  - Check HGBA1C and lipid profile  - PT/OT  - Activity: bedrest    Cardiovascular:  - SBP goal: 110-140  - Normovolemia  - EKG - NSR  - Echo - pending    Pulmonary:  - HOB 45  - Maintain SaO2 > 92%  - CXR - pending    GI:  - NPO  - GI ppx- not indicated at this time  - Bowel Regimen: Senna     :  - 1L NS @50 cc/hr  - Strict Is/Os  - Sodium Goal: 135-145  - BMP Q8  - Maintain Mg > 2, K >4, Phos> 3  - Екатерина    Endo:  - Keep euglycemia - Glu 140-180  - FS Q6  - TSH, Lipid profile, a1c    Hematology:  - SCDs  - Holding AC/AP in the setting of SAH  - b/l LE doppler    ID:  - Afebrile  - Monitor for fever      Code Status: Full 89y Female PMH HTN, Afib on Eliquis, Pacemaker placement in 2018, CTA Head March 2024 revealing three saccular aneurysms, who presents to the ED BIBEMS s/p cardiac arrest found to have diffuse subarachnoid throughout the bilateral hemispheric sulci and basal cisterns.       Assessment:	      Neurological:  - Neuro Checks Q1  - Repeat CTH in 6hr or sooner if change in mental status  - 23.4% saline STAT  - Precedex for sedation  - NSx c/s for possible EVD placement  - Check HGBA1C and lipid profile  - PT/OT  - Activity: bedrest    Cardiovascular: s/p cardiac arrest  - SBP goal: 110-140  - Normovolemia  - EKG - NSR  - Echo - pending  - Cardiology c/s    Pulmonary:  - intubated   - HOB 45  - Maintain SaO2 > 92%    GI:  - NPO  - GI ppx- famotidine 20mg daily  - Bowel Regimen: Senna     :  - 1L NS @50 cc/hr  - Strict Is/Os  - Sodium Goal: 135-145  - Maintain Mg > 2, K >4, Phos> 3  - Nephrology c/s for anuria, hypercalcemia, increase creatinine  - Екатерина    Endo:  - Keep euglycemia - Glu 140-180  - TSH, Lipid profile, a1c    Hematology:  - SCDs  - Holding AC/AP in the setting of SAH  - b/l LE doppler    ID:  - Afebrile  - Monitor for fever    Mis:  GOC discussed held with the family    Code Status: Full

## 2024-05-01 DIAGNOSIS — Z51.5 ENCOUNTER FOR PALLIATIVE CARE: ICD-10-CM

## 2024-05-01 DIAGNOSIS — I47.20 VENTRICULAR TACHYCARDIA, UNSPECIFIED: ICD-10-CM

## 2024-05-01 DIAGNOSIS — Z95.0 PRESENCE OF CARDIAC PACEMAKER: ICD-10-CM

## 2024-05-01 DIAGNOSIS — I60.9 NONTRAUMATIC SUBARACHNOID HEMORRHAGE, UNSPECIFIED: ICD-10-CM

## 2024-05-01 DIAGNOSIS — E83.52 HYPERCALCEMIA: ICD-10-CM

## 2024-05-01 DIAGNOSIS — Z79.01 LONG TERM (CURRENT) USE OF ANTICOAGULANTS: ICD-10-CM

## 2024-05-01 DIAGNOSIS — I48.91 UNSPECIFIED ATRIAL FIBRILLATION: ICD-10-CM

## 2024-05-01 DIAGNOSIS — Z66 DO NOT RESUSCITATE: ICD-10-CM

## 2024-05-01 DIAGNOSIS — I49.01 VENTRICULAR FIBRILLATION: ICD-10-CM

## 2024-05-01 DIAGNOSIS — I10 ESSENTIAL (PRIMARY) HYPERTENSION: ICD-10-CM

## 2024-05-01 DIAGNOSIS — I46.9 CARDIAC ARREST, CAUSE UNSPECIFIED: ICD-10-CM

## 2024-05-01 DIAGNOSIS — G93.1 ANOXIC BRAIN DAMAGE, NOT ELSEWHERE CLASSIFIED: ICD-10-CM

## 2024-05-01 LAB
CULTURE RESULTS: SIGNIFICANT CHANGE UP
CULTURE RESULTS: SIGNIFICANT CHANGE UP
SPECIMEN SOURCE: SIGNIFICANT CHANGE UP
SPECIMEN SOURCE: SIGNIFICANT CHANGE UP

## 2024-05-29 ENCOUNTER — APPOINTMENT (OUTPATIENT)
Dept: CARDIOLOGY | Facility: CLINIC | Age: 89
End: 2024-05-29

## 2024-08-22 NOTE — H&P ADULT - NSVTERISKREFERASSESS_GEN_ALL_CORE
[Time Spent: ___ minutes] : I have spent [unfilled] minutes of time on the encounter which excludes teaching and separately reported services.
Refer to the Assessment tab to view/cancel completed assessment.

## 2025-01-18 NOTE — DISCHARGE NOTE ADULT - NSTOBACCONEVERSMOKERY/N_GEN_A
PT states that she has been experiencing menstrual bleed since December 31st . PT states that menstrual bleeding has been consistent , OBGYN appointment setup for next week, informed that she has low Hemoglobin, shortness of breath , headaches and weakness when waking. Recent blood lab results showed a hemoglobin of 8.2 ( Two days ago)   
No